# Patient Record
Sex: FEMALE | Race: WHITE | NOT HISPANIC OR LATINO | Employment: OTHER | URBAN - METROPOLITAN AREA
[De-identification: names, ages, dates, MRNs, and addresses within clinical notes are randomized per-mention and may not be internally consistent; named-entity substitution may affect disease eponyms.]

---

## 2017-05-15 ENCOUNTER — TRANSFERRED RECORDS (OUTPATIENT)
Dept: HEALTH INFORMATION MANAGEMENT | Facility: CLINIC | Age: 67
End: 2017-05-15
Payer: MEDICARE

## 2019-02-18 ENCOUNTER — TRANSFERRED RECORDS (OUTPATIENT)
Dept: HEALTH INFORMATION MANAGEMENT | Facility: CLINIC | Age: 69
End: 2019-02-18
Payer: MEDICARE

## 2021-09-15 ENCOUNTER — TRANSFERRED RECORDS (OUTPATIENT)
Dept: HEALTH INFORMATION MANAGEMENT | Facility: CLINIC | Age: 71
End: 2021-09-15
Payer: MEDICARE

## 2022-03-27 NOTE — TELEPHONE ENCOUNTER
FUTURE VISIT INFORMATION      FUTURE VISIT INFORMATION:    Date: 4/27/2022    Time: 3pm    Location: [facility]  REFERRAL INFORMATION:    Referring provider:  Dr. Adler    Referring providers clinic:  [facility]     Reason for visit/diagnosis  Dizziness     RECORDS REQUESTED FROM:       Clinic name Comments Records Status Imaging Status   [facility]  Requested  Requested          [facility]-10/29/2021    [facility] Puga-10/25/2021 Care Everywhere Requested to PACS                       3/28/2022-Request for images faxed to [facility]-MR @ 822am    4/20/2022-[facility] images now in PACS-MR @ 710am

## 2022-04-26 NOTE — PROGRESS NOTES
[facility]    Neurology Consult    4/27/2022      Pat Barclay MRN# [id number]   YOB: 1950 Age: 71 year old      Primary care provider:   No primary care provider on file.    Requesting provider:   Jia Adler    Reason for Consult:  Dizziness and imbalance    IMPRESSIONS:  Pat Barclay is a 71 year old female with a past medical history of bradycardia s/p pacemaker, progressive cerebral atrophy and white matter disease, bilateral hearing loss, who presents with dizziness and imbalance after neck extension. On exam she has downbeat nystagmus that can be elicited with head extension in all positions (right, center, left) which is associated with her vertigo. She has severely impaired smooth pursuit but no gaze-evoked nystagmus. This pattern is not typical of BPPV but is concerning for a central process, like a cerebellar degeneration or a lesion in the posterior fossa. Because of her pace-maker, we'll start with a CT scan. I will order it as a CT venogram so that we can see the vasculature at the skull base, which can be compressed with head extension. There is also a mild left sided dysmetria which also points to a cerebellar lesion. There is a flavor of syndromic-ness to her picture given the severe hearing loss, white matter disease, bradycardia, and now the central eye movement abnormality and ataxia.      We'll work up a structural lesion but will also consider an empiric trial of 4-AP to suppress the downbeat nystagmus and potentially help with the imbalance.     The patient will schedule the CT venogram and an accompanying ultrasound at her convenience and we will follow-up after the testing. We will obtain the MRI of the brain from [facility].     Recommendations:  1. Consider 4AP  2. CTVenogram head and neck in neutal and head extension  3. US TOS/IJ   4. Follow-up after imaging    HISTORY OF PRESENT ILLNESS:  Pat Barclay is a 71 year old female with a  past medical history of bradycardia s/p pacemaker, progressive cerebral atrophy and white matter disease who presents with the following symptoms:    DIZZINESS:  She first noticed symptoms in 2004 when she was extending her head looking at birds. She felt a swirling vertigo that stopped when the head was back in neutral position. This continued to be a bad head position so she avoided it. Flexion and head turning to the side were not bad. She was pretty stable until recently. The symptoms became persistent despite head position, perhaps around Spring 2021. She was not sick at the time. She has never had COVID through all this. She has had both Pfizers and a booster. She did have a mild headache after each vaccine but otherwise tolerated them fine.      This feeling of swirling can now be triggered by getting up from a chair. When sitting still and not moving her head she feels a little swirling but there is no clear directionality to it. She does not notice it as much riding in a car. She doesn't drive much herself. There are days when the symptoms are more severe and when that happens her balance is very off. On these days she will take some aspirin or ibuprofen and lay down. She does not feel the swirling laying flat.     There is rarely spinning vertigo. There is no rocking vertigo.     AURAL SYMPTOMS:  No tinnitus or ear pressure. She has hearing loss and has hearing aids in both ears.     HEADACHE:  There is sometimes some head pressure on the forehead, noticed in the last year. This is better when she lays down. It does not wake her up. The pressure can be there all day. There is no nausea, light, or sound sensitivity. She does not have a history of migraine or other kinds of headache. She does not have a history of visual aura. She has not lost vision in one eye. She does not experience diplopia.     NECK/UPPER EXT SYMPTOMS: There is no neck pain, shoulder pain, numbness, tingling, or swelling in the hands.  Her hands do feel cold however and this is worse in the right arm which goes up to the elbow. The hands do turn white.     BULBAR SYMPTOMS:  There is no dysphagia, throat pressure, or chronic cough. She does not endorse speech problems. She sometimes has a hard time thinking of the words to say. There have been mild problems with memory. She can chart the course of the day and not do dangerous things like leave the stove on.     CARDIAC:  No chest pain, shortness of breath, palpitation, or syncope. She has a pacemaker for bradycardia placed in 2016. She has recently found out that she has had some low blood pressure last year.     OTHER:   She feels freezing cold all the time.     PAST MEDICAL HISTORY:  Bradycardia  Low blood pressure  Hypothyroidism  Insomnia  Depression    PAST SURGICAL HISTORY:  Pacemaker for bradycardia 2016--single lead pacemaker in the right atrium.  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  Her son  suddenly for cardiomyopathy in  at the age of 24 potentially from a virus.     ALLERGIES:  Naprosyn--redness, no itching.       MEDICATIONS:      azelaic acid (FINACIA) 15 % external gel, Apply 1 g topically See Admin Instructions, Disp: , Rfl:      cholecalciferol (VITAMIN D3) 25 mcg (1000 units) capsule, Take 1,000 Units by mouth daily, Disp: , Rfl:      estrogen conj-medroxyPROGESTERone (PREMPRO) 0.3-1.5 MG tablet, Take 1 tablet by mouth See Admin Instructions, Disp: , Rfl:      fludrocortisone (FLORINEF) 0.1 MG tablet, Take 1 tablet by mouth See Admin Instructions, Disp: , Rfl:      levothyroxine (SYNTHROID/LEVOTHROID) 50 MCG tablet, Take 1 tablet by mouth daily (with dinner), Disp: , Rfl:      traZODone (DESYREL) 50 MG tablet, Take 1 tablet by mouth See Admin Instructions, Disp: , Rfl:      albuterol (PROAIR  HFA/PROVENTIL HFA/VENTOLIN HFA) 108 (90 Base) MCG/ACT inhaler, Inhale 1 puff into the lungs See Admin Instructions (Patient not taking: Reported on 4/27/2022), Disp: , Rfl:      amoxicillin (AMOXIL) 500 MG capsule, Take 1 capsule by mouth See Admin Instructions (Patient not taking: Reported on 4/27/2022), Disp: , Rfl:      citalopram (CELEXA) 10 MG tablet, Take 1 tablet by mouth See Admin Instructions (Patient not taking: Reported on 4/27/2022), Disp: , Rfl:      cyclobenzaprine (FLEXERIL) 5 MG tablet, Take 1 tablet by mouth daily as needed (Patient not taking: Reported on 4/27/2022), Disp: , Rfl:      HYDROcodone-acetaminophen (NORCO) 5-325 MG tablet, Take 1 tablet by mouth See Admin Instructions (Patient not taking: Reported on 4/27/2022), Disp: , Rfl:      meclizine (ANTIVERT) 25 MG tablet, Take 1 tablet by mouth daily (Patient not taking: Reported on 4/27/2022), Disp: , Rfl:      methylPREDNISolone (MEDROL DOSEPAK) 4 MG tablet therapy pack, Take 1 tablet by mouth See Admin Instructions (Patient not taking: Reported on 4/27/2022), Disp: , Rfl:     PHYSICAL EXAM:  Vitals:  /53   Pulse 87   SpO2 97%     General: Patient is well-nourished, well-groomed, in no apparent distress    HEENT: Head is atraumatic, eyes look normal exteriorly, throat clear, neck supple.  Ext: Warm, well-perfused. No edema.    Neurologic:  Mental Status: Alert and oriented to person, place, time, and situation.  Able to provide an excellent history.     Cranial Nerves: Visual fields full to confrontation.  Pupils equal and reactive to light.  Extraocular movements full with impaired smooth pursuit in all directions. Normal saccade. OK upgaze.  Face sensation normal.  Normal head impulse testing.  Normal hearing to finger rub but has hearing aids. Face symmetric with normal movements. Slight dysarthria. Tongue and palate midline.  Normal shoulder elevation.      Motor: Normal bulk and tone.  No pronator drift.  Normal foot taps.  Full  strength to confrontational testing.    Sensory: Normal light touch, temperature, and vibration.    Reflexes: Biceps 2/4, Brachioradialis 1/4, Triceps 2/4, Knees 2/4 on the right 3/4 on the left, Ankles 2/4.     Coordination: Normal finger to nose, rapid alternating movements are incoordinated on the left. Slight dysmetria and tremor end point on the left.     Gait: Normal stance width.  Negative Romberg but does sway a little.  Good gait initiation.  Good stride length. Reduced arm swing but clearly present. No tremor with gait. No posturing with gait. Stumbles with turn and grabs the wall. Not able to walk 5 steps in tandem without a side step.     Focused Vestibular:  Positional Testing: Patient was placed in the supine, right ear down, left ear down, right head-hanging, center-hanging, and left head-hanging positions.  There was immediate onset of downbeat nystagmus and vertigo elicited in all positions along with nausea. The downbeat was most prominent in the center head hanging position. Nystagmus lasted at least 20-seconds in each position but positioning was not maintained longer due to patient nausea. The nystagmus did not decay.     Adson's test for Thoracic Outlet Syndrome:  Arms abducted: Negative  Pain under the RIGHT clavicle: No   Pain at the RIGHT 1st rib-sternum insertion site: No  Pain under the LEFT clavicle: No  Pain at the LEFT 1st rib-sternum insertion site: NO     DATA:  All available and relevant labs, imaging, and other procedures were reviewed personally.   Last brain imaging:  MRI brain without contrast performed in [place] at [facility] 9- read as mild parenchymal volume loss and moderate supratentorial white matter pontine hyperintensities consistent with areas of chronic microvascular ischemic change.  Volume loss and chronic microvascular ischemic changes have progressed as compared to the 2008 study.  Ventricles are mildly dilated, proportional to sulci, likely a result of  central atrophy.  Mild bilateral temporomandibular joint arthropathy.  Small left mastoid fluid.      MRI lumbar spine with and without contrast [facility] imaging 5- in [facility] read as showing transitional lumbosacral morphology.  Demonstration of right hemilaminectomy changes at L2-3 and L3-4 and left hemilaminectomy changes at L5-S1 demonstration a circumferential disc osteophyte complex at L2-3 impinging on the right descending L3 nerve.  Demonstration of circumferential disc osteophyte complex at L4-L5 with impingement of the left descending L5 nerve unchanged mild to moderate left foraminal narrowing at L4-5 mild right foraminal narrowing at L2-3Last audiogram:    70-minutes were spent in evaluation, examination, and documentation.

## 2022-04-27 ENCOUNTER — PRE VISIT (OUTPATIENT)
Dept: NEUROLOGY | Facility: CLINIC | Age: 72
End: 2022-04-27

## 2022-04-27 ENCOUNTER — OFFICE VISIT (OUTPATIENT)
Dept: NEUROLOGY | Facility: CLINIC | Age: 72
End: 2022-04-27
Payer: MEDICARE

## 2022-04-27 VITALS — SYSTOLIC BLOOD PRESSURE: 107 MMHG | OXYGEN SATURATION: 97 % | HEART RATE: 87 BPM | DIASTOLIC BLOOD PRESSURE: 53 MMHG

## 2022-04-27 DIAGNOSIS — R27.0 ATAXIA: ICD-10-CM

## 2022-04-27 DIAGNOSIS — G54.0 TOS (THORACIC OUTLET SYNDROME): ICD-10-CM

## 2022-04-27 DIAGNOSIS — I87.1 COMPRESSION OF VEIN: Primary | ICD-10-CM

## 2022-04-27 DIAGNOSIS — H55.00 NYSTAGMUS: ICD-10-CM

## 2022-04-27 PROCEDURE — 99205 OFFICE O/P NEW HI 60 MIN: CPT | Performed by: PSYCHIATRY & NEUROLOGY

## 2022-04-27 RX ORDER — CYCLOBENZAPRINE HCL 5 MG
1 TABLET ORAL DAILY PRN
COMMUNITY
End: 2022-10-07

## 2022-04-27 RX ORDER — METHYLPREDNISOLONE 4 MG
1 TABLET, DOSE PACK ORAL SEE ADMIN INSTRUCTIONS
COMMUNITY
Start: 2021-11-16 | End: 2022-10-07

## 2022-04-27 RX ORDER — FLUDROCORTISONE ACETATE 0.1 MG/1
1 TABLET ORAL SEE ADMIN INSTRUCTIONS
COMMUNITY
Start: 2021-04-29 | End: 2022-10-07

## 2022-04-27 RX ORDER — AZELAIC ACID 0.15 G/G
1 GEL TOPICAL SEE ADMIN INSTRUCTIONS
COMMUNITY
Start: 2021-09-13 | End: 2022-10-07

## 2022-04-27 RX ORDER — AMOXICILLIN 500 MG/1
1 CAPSULE ORAL SEE ADMIN INSTRUCTIONS
COMMUNITY
Start: 2021-05-03 | End: 2022-10-07

## 2022-04-27 RX ORDER — CITALOPRAM HYDROBROMIDE 10 MG/1
1 TABLET ORAL SEE ADMIN INSTRUCTIONS
COMMUNITY
Start: 2021-06-01 | End: 2023-01-20

## 2022-04-27 RX ORDER — TRAZODONE HYDROCHLORIDE 50 MG/1
1 TABLET, FILM COATED ORAL SEE ADMIN INSTRUCTIONS
COMMUNITY
Start: 2022-04-19

## 2022-04-27 RX ORDER — ALBUTEROL SULFATE 90 UG/1
1 AEROSOL, METERED RESPIRATORY (INHALATION) SEE ADMIN INSTRUCTIONS
COMMUNITY
End: 2022-10-07

## 2022-04-27 RX ORDER — MECLIZINE HYDROCHLORIDE 25 MG/1
1 TABLET ORAL DAILY
COMMUNITY
Start: 2021-04-29 | End: 2022-10-07

## 2022-04-27 RX ORDER — LEVOTHYROXINE SODIUM 50 UG/1
1 TABLET ORAL
COMMUNITY
Start: 2021-06-01 | End: 2023-01-20

## 2022-04-27 RX ORDER — ESTROGEN,CON/M-PROGEST ACET 0.3-1.5MG
1 TABLET ORAL SEE ADMIN INSTRUCTIONS
COMMUNITY
Start: 2022-04-19

## 2022-04-27 RX ORDER — HYDROCODONE BITARTRATE AND ACETAMINOPHEN 5; 325 MG/1; MG/1
1 TABLET ORAL SEE ADMIN INSTRUCTIONS
COMMUNITY
Start: 2021-09-13 | End: 2023-01-20

## 2022-04-27 NOTE — Clinical Note
4/27/2022       RE: Pat Barclay  [facility] [facility] [address]  [place] [address]     Dear Colleague,    Thank you for referring your patient, Pat Barclay, to the [facility]. Please see a copy of my visit note below.    No notes on file    Again, thank you for allowing me to participate in the care of your patient.      Sincerely,    Elva PEDROZA Cha, MD

## 2022-04-27 NOTE — LETTER
4/27/2022       RE: Pat Barclay  [address]  [place] [address]     Dear Colleague,    Thank you for referring your patient, Pat Braclay, to the [facility] [place] [facility]. Please see a copy of my visit note below.    [facility]    Neurology Consult    4/27/2022      Pat Barclay MRN# [id number]   YOB: 1950 Age: 71 year old      Primary care provider:   No primary care provider on file.    Requesting provider:   Jia Adler    Reason for Consult:  Dizziness and imbalance    IMPRESSIONS:  Pat Barclay is a 71 year old female with a past medical history of bradycardia s/p pacemaker, progressive cerebral atrophy and white matter disease, bilateral hearing loss, who presents with dizziness and imbalance after neck extension. On exam she has downbeat nystagmus that can be elicited with head extension in all positions (right, center, left) which is associated with her vertigo. She has severely impaired smooth pursuit but no gaze-evoked nystagmus. This pattern is not typical of BPPV but is concerning for a central process, like a cerebellar degeneration or a lesion in the posterior fossa. Because of her pace-maker, we'll start with a CT scan. I will order it as a CT venogram so that we can see the vasculature at the skull base, which can be compressed with head extension. There is also a mild left sided dysmetria which also points to a cerebellar lesion. There is a flavor of syndromic-ness to her picture given the severe hearing loss, white matter disease, bradycardia, and now the central eye movement abnormality and ataxia.      We'll work up a structural lesion but will also consider an empiric trial of 4-AP to suppress the downbeat nystagmus and potentially help with the imbalance.     The patient will schedule the CT venogram and an accompanying ultrasound at her convenience  and we will follow-up after the testing. We will obtain the MRI of the brain from "[employer] imaging.     Recommendations:  1. Consider 4AP  2. CTVenogram head and neck in neutal and head extension  3. US TOS/IJ   4. Follow-up after imaging    HISTORY OF PRESENT ILLNESS:  Pat Barclay is a 71 year old female with a past medical history of bradycardia s/p pacemaker, progressive cerebral atrophy and white matter disease who presents with the following symptoms:    DIZZINESS:  She first noticed symptoms in 2004 when she was extending her head looking at birds. She felt a swirling vertigo that stopped when the head was back in neutral position. This continued to be a bad head position so she avoided it. Flexion and head turning to the side were not bad. She was pretty stable until recently. The symptoms became persistent despite head position, perhaps around Spring 2021. She was not sick at the time. She has never had COVID through all this. She has had both Pfizers and a booster. She did have a mild headache after each vaccine but otherwise tolerated them fine.      This feeling of swirling can now be triggered by getting up from a chair. When sitting still and not moving her head she feels a little swirling but there is no clear directionality to it. She does not notice it as much riding in a car. She doesn't drive much herself. There are days when the symptoms are more severe and when that happens her balance is very off. On these days she will take some aspirin or ibuprofen and lay down. She does not feel the swirling laying flat.     There is rarely spinning vertigo. There is no rocking vertigo.     AURAL SYMPTOMS:  No tinnitus or ear pressure. She has hearing loss and has hearing aids in both ears.     HEADACHE:  There is sometimes some head pressure on the forehead, noticed in the last year. This is better when she lays down. It does not wake her up. The pressure can be there all day. There is no nausea, light, or  sound sensitivity. She does not have a history of migraine or other kinds of headache. She does not have a history of visual aura. She has not lost vision in one eye. She does not experience diplopia.     NECK/UPPER EXT SYMPTOMS: There is no neck pain, shoulder pain, numbness, tingling, or swelling in the hands. Her hands do feel cold however and this is worse in the right arm which goes up to the elbow. The hands do turn white.     BULBAR SYMPTOMS:  There is no dysphagia, throat pressure, or chronic cough. She does not endorse speech problems. She sometimes has a hard time thinking of the words to say. There have been mild problems with memory. She can chart the course of the day and not do dangerous things like leave the stove on.     CARDIAC:  No chest pain, shortness of breath, palpitation, or syncope. She has a pacemaker for bradycardia placed in 2016. She has recently found out that she has had some low blood pressure last year.     OTHER:   She feels freezing cold all the time.     PAST MEDICAL HISTORY:  Bradycardia  Low blood pressure  Hypothyroidism  Insomnia  Depression    PAST SURGICAL HISTORY:  Pacemaker for bradycardia 2016--single lead pacemaker in the right atrium.  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     ALLERGIES:  Naprosyn--redness, no itching.       MEDICATIONS:      azelaic acid (FINACIA) 15 % external gel, Apply 1 g topically See Admin Instructions, Disp: , Rfl:      cholecalciferol (VITAMIN D3) 25 mcg (1000 units) capsule, Take 1,000 Units by mouth daily, Disp: , Rfl:      estrogen conj-medroxyPROGESTERone (PREMPRO) 0.3-1.5 MG tablet, Take 1 tablet by mouth See Admin Instructions, Disp: , Rfl:      fludrocortisone (FLORINEF)  0.1 MG tablet, Take 1 tablet by mouth See Admin Instructions, Disp: , Rfl:      levothyroxine (SYNTHROID/LEVOTHROID) 50 MCG tablet, Take 1 tablet by mouth daily (with dinner), Disp: , Rfl:      traZODone (DESYREL) 50 MG tablet, Take 1 tablet by mouth See Admin Instructions, Disp: , Rfl:      albuterol (PROAIR HFA/PROVENTIL HFA/VENTOLIN HFA) 108 (90 Base) MCG/ACT inhaler, Inhale 1 puff into the lungs See Admin Instructions (Patient not taking: Reported on 4/27/2022), Disp: , Rfl:      amoxicillin (AMOXIL) 500 MG capsule, Take 1 capsule by mouth See Admin Instructions (Patient not taking: Reported on 4/27/2022), Disp: , Rfl:      citalopram (CELEXA) 10 MG tablet, Take 1 tablet by mouth See Admin Instructions (Patient not taking: Reported on 4/27/2022), Disp: , Rfl:      cyclobenzaprine (FLEXERIL) 5 MG tablet, Take 1 tablet by mouth daily as needed (Patient not taking: Reported on 4/27/2022), Disp: , Rfl:      HYDROcodone-acetaminophen (NORCO) 5-325 MG tablet, Take 1 tablet by mouth See Admin Instructions (Patient not taking: Reported on 4/27/2022), Disp: , Rfl:      meclizine (ANTIVERT) 25 MG tablet, Take 1 tablet by mouth daily (Patient not taking: Reported on 4/27/2022), Disp: , Rfl:      methylPREDNISolone (MEDROL DOSEPAK) 4 MG tablet therapy pack, Take 1 tablet by mouth See Admin Instructions (Patient not taking: Reported on 4/27/2022), Disp: , Rfl:     PHYSICAL EXAM:  Vitals:  /53   Pulse 87   SpO2 97%     General: Patient is well-nourished, well-groomed, in no apparent distress    HEENT: Head is atraumatic, eyes look normal exteriorly, throat clear, neck supple.  Ext: Warm, well-perfused. No edema.    Neurologic:  Mental Status: Alert and oriented to person, place, time, and situation.  Able to provide an excellent history.     Cranial Nerves: Visual fields full to confrontation.  Pupils equal and reactive to light.  Extraocular movements full with impaired smooth pursuit in all directions. Normal  saccade. OK upgaze.  Face sensation normal.  Normal head impulse testing.  Normal hearing to finger rub but has hearing aids. Face symmetric with normal movements. Slight dysarthria. Tongue and palate midline.  Normal shoulder elevation.      Motor: Normal bulk and tone.  No pronator drift.  Normal foot taps.  Full strength to confrontational testing.    Sensory: Normal light touch, temperature, and vibration.    Reflexes: Biceps 2/4, Brachioradialis 1/4, Triceps 2/4, Knees 2/4 on the right 3/4 on the left, Ankles 2/4.     Coordination: Normal finger to nose, rapid alternating movements are incoordinated on the left. Slight dysmetria and tremor end point on the left.     Gait: Normal stance width.  Negative Romberg but does sway a little.  Good gait initiation.  Good stride length. Reduced arm swing but clearly present. No tremor with gait. No posturing with gait. Stumbles with turn and grabs the wall. Not able to walk 5 steps in tandem without a side step.     Focused Vestibular:  Positional Testing: Patient was placed in the supine, right ear down, left ear down, right head-hanging, center-hanging, and left head-hanging positions.  There was immediate onset of downbeat nystagmus and vertigo elicited in all positions along with nausea. The downbeat was most prominent in the center head hanging position. Nystagmus lasted at least 20-seconds in each position but positioning was not maintained longer due to patient nausea. The nystagmus did not decay.     Adson's test for Thoracic Outlet Syndrome:  Arms abducted: Negative  Pain under the RIGHT clavicle: No   Pain at the RIGHT 1st rib-sternum insertion site: No  Pain under the LEFT clavicle: No  Pain at the LEFT 1st rib-sternum insertion site: NO     DATA:  All available and relevant labs, imaging, and other procedures were reviewed personally.   Last brain imaging:  MRI brain without contrast performed in [place] at [facility] 9- read as mild parenchymal  volume loss and moderate supratentorial white matter pontine hyperintensities consistent with areas of chronic microvascular ischemic change.  Volume loss and chronic microvascular ischemic changes have progressed as compared to the 2008 study.  Ventricles are mildly dilated, proportional to sulci, likely a result of central atrophy.  Mild bilateral temporomandibular joint arthropathy.  Small left mastoid fluid.      MRI lumbar spine with and without contrast [facility] imaging 5- in [facility] read as showing transitional lumbosacral morphology.  Demonstration of right hemilaminectomy changes at L2-3 and L3-4 and left hemilaminectomy changes at L5-S1 demonstration a circumferential disc osteophyte complex at L2-3 impinging on the right descending L3 nerve.  Demonstration of circumferential disc osteophyte complex at L4-L5 with impingement of the left descending L5 nerve unchanged mild to moderate left foraminal narrowing at L4-5 mild right foraminal narrowing at L2-3Last audiogram:    70-minutes were spent in evaluation, examination, and documentation.        Sincerely,    Elva PEDROZA Cha, MD

## 2022-05-12 ENCOUNTER — ANCILLARY PROCEDURE (OUTPATIENT)
Dept: CT IMAGING | Facility: CLINIC | Age: 72
End: 2022-05-12
Attending: PSYCHIATRY & NEUROLOGY
Payer: MEDICARE

## 2022-05-12 DIAGNOSIS — G54.0 TOS (THORACIC OUTLET SYNDROME): ICD-10-CM

## 2022-05-12 DIAGNOSIS — H55.00 NYSTAGMUS: ICD-10-CM

## 2022-05-12 DIAGNOSIS — I87.1 COMPRESSION OF VEIN: ICD-10-CM

## 2022-05-12 LAB
CREAT BLD-MCNC: 0.7 MG/DL (ref 0.5–1)
GFR SERPL CREATININE-BSD FRML MDRD: >60 ML/MIN/1.73M2

## 2022-05-12 PROCEDURE — 82565 ASSAY OF CREATININE: CPT | Performed by: PATHOLOGY

## 2022-05-12 PROCEDURE — 70496 CT ANGIOGRAPHY HEAD: CPT | Mod: MG | Performed by: RADIOLOGY

## 2022-05-12 PROCEDURE — 70498 CT ANGIOGRAPHY NECK: CPT | Mod: MG | Performed by: RADIOLOGY

## 2022-05-12 PROCEDURE — G1004 CDSM NDSC: HCPCS | Mod: GC | Performed by: RADIOLOGY

## 2022-05-12 RX ORDER — IOPAMIDOL 755 MG/ML
75 INJECTION, SOLUTION INTRAVASCULAR ONCE
Status: COMPLETED | OUTPATIENT
Start: 2022-05-12 | End: 2022-05-12

## 2022-05-12 RX ADMIN — IOPAMIDOL 75 ML: 755 INJECTION, SOLUTION INTRAVASCULAR at 09:00

## 2022-05-17 ENCOUNTER — TELEPHONE (OUTPATIENT)
Dept: NEUROLOGY | Facility: CLINIC | Age: 72
End: 2022-05-17
Payer: MEDICARE

## 2022-05-17 NOTE — TELEPHONE ENCOUNTER
[facility] Call Center    Phone Message    May a detailed message be left on voicemail: yes     Reason for Call: Other: Patient is wanting a call back to discuss results from her appt with Dr. Segundo on 4/27/2022. Please call patient back at [phone]     Action Taken: Message routed to:  [facility] ([facility] Neurology    Travel Screening: Not Applicable

## 2022-05-18 ENCOUNTER — TELEPHONE (OUTPATIENT)
Dept: NEUROLOGY | Facility: CLINIC | Age: 72
End: 2022-05-18
Payer: MEDICARE

## 2022-05-18 NOTE — TELEPHONE ENCOUNTER
Returned call confirming I received her phone call back about visit offered on 5/27. We will schedule her for return video visit on 5/27 at 4:30p with Dr. Segundo to review test results.     Janine JACOME

## 2022-05-18 NOTE — TELEPHONE ENCOUNTER
I returned pt call. I lvm. I let her know Dr. Segundo reviewed her message. Dr. Segundo would like to see her for a follow up visit to review test results. We can add her for a virtual visit on Friday May 27th at 4:30p. I asked she call back to confirm day/time.     Janine JACOME

## 2022-05-27 ENCOUNTER — VIRTUAL VISIT (OUTPATIENT)
Dept: NEUROLOGY | Facility: CLINIC | Age: 72
End: 2022-05-27
Payer: MEDICARE

## 2022-05-27 DIAGNOSIS — R27.0 ATAXIA: Primary | ICD-10-CM

## 2022-05-27 DIAGNOSIS — H55.00 NYSTAGMUS: ICD-10-CM

## 2022-05-27 PROCEDURE — 99214 OFFICE O/P EST MOD 30 MIN: CPT | Mod: 95 | Performed by: PSYCHIATRY & NEUROLOGY

## 2022-05-27 NOTE — LETTER
"5/27/2022       RE: Pat Barclay  [address] [place] [address] [address]  [place] [address]     Dear Colleague,    Thank you for referring your patient, Pat Barclay, to the [facility] NEUROLOGY CLINIC [place] [facility]. Please see a copy of my visit note below.      IMPRESSIONS:  Pat Barclay is a 71 year old female with a past medical history of bradycardia s/p pacemaker, progressive cerebral atrophy and white matter disease, bilateral hearing loss, who presents with dizziness and imbalance after neck extension. On exam she has downbeat nystagmus that can be elicited with head extension in all positions (right, center, left) which is associated with her vertigo. She has severely impaired smooth pursuit but no gaze-evoked nystagmus. This pattern is not typical of BPPV but is concerning for a central process, like a cerebellar degeneration or a lesion in the posterior fossa.     Because of her pace-maker, we started with a CT venogram so that we can see the vasculature at the skull base, which can be compressed with head extension. There is also a mild left sided dysmetria which also points to a cerebellar lesion. There is a flavor of syndromic-ness to her picture given the severe hearing loss, white matter disease, bradycardia, and now the central eye movement abnormality and ataxia.       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 The volume loss was so severe that it almost looked like a cyst. See screenshot below.    She and her  both caught COVID in the last month. She had a " fever, cough, headache, bad sore throat. She did not have trouble breathing. She did not lose smell or taste. Food had a bit of metallic taste.  The main problem is the imbalance along with a sense of faintness. It is not a feeling of disorientation or vertigo. She does not have headache. She does not have problems with visual disorientation or simultagnosia.     Mother  at 80-stroke  Father  at 78-lung cancer   Sister-alive at 75, HTN   Son and daughter, healthy  Son-alive 48, healthy  No balance problems    We discussed the CT exam findings showing the areas of particular brain atrophy and the correlation with her ataxia and downbeat nystagmus. A trial of 4-AP which can suppress downbeat nystagmus and help balance is definitely worth a try. We will also start an ataxia screen with some metabolic tests and genetic screen.    Plan:  1. Trial 4-AP--Rx sent to [facility]  2. Paraneoplastic, Vitamin E, Anti-KRYSTA  3. Consider genetics referral if tests are negative      DATA:  I personally reviewed the following data.    Last brain imaging:  CTV HEAD NECK WITH CONTRAST 2022    Findings: Diminutive size of the left transverse, sigmoid, and  internal jugular vein relative to the right. The left internal jugular  vein then is markedly narrowed between the styloid process and  transverse process of C1 which also persists on the extension images.  No intraluminal filling defects visualized intracranially or  extracranially. Bilateral subclavian and axillary veins are widely  patent without evidence for stenosis with the arms down.    There is no mass effect, midline shift, or  intracranial hemorrhage.   The ventricles are proportionate to the cerebral sulci. The gray-white  matter differentiation of the cerebral hemispheres is preserved.  Postcontrast images demonstrate no abnormal intracranial parenchymal  or meningeal enhancement. Moderate diffuse cerebral atrophy. Unchanged  asymmetric volume loss in  the right parasagittal occipital lobe.     The orbits, visualized portions of paranasal sinuses, and mastoid air  cells are relatively clear.     Scattered multilevel cervical spondylosis without significant spinal  canal or neuroforaminal narrowing. No suspicious sclerotic or lucent  osseous lesion.    Cervical soft tissues are unremarkable. Thyroid is unremarkable.    Partially visualized left chest wall ICD/Pacemaker.  Impression: Impression:    1. 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 Significance is uncertain given  the small size of this vein inferiorly. No intraluminal thrombus or  significant extraluminal stenosis in either the intracranial or  extracranial veins.  2. No CT evidence for thoracic outlet syndrome.  3. Moderate diffuse cerebral volume loss, likely sequelae of chronic  small vessel ischemic disease.    I have personally reviewed the examination and initial interpretation  and I agree with the findings.    ROCÍO TORRES MD                 38-minutes were spent in evaluation, examination, and counseling as well as documentation on the date of service. After a review of the patient s situation, this visit was changed from an in-person visit to a video visit to reduce the risk of COVID-19 exposure.          Sincerely,    Elva PEDROZA Cha, MD

## 2022-05-27 NOTE — Clinical Note
5/27/2022       RE: Pat Barclay  [facility]  [place] [address]     Dear Colleague,    Thank you for referring your patient, Pat Barclay, to the [facility]UROLOG[facility]. Please see a copy of my visit note below.    No notes on file    Again, thank you for allowing me to participate in the care of your patient.      Sincerely,    Elva PEDROZA Cha, MD

## 2022-05-27 NOTE — PROGRESS NOTES
"The patient is being evaluated via a billable video visit.    How would you like to obtain your AVS? MyChart  If the video visit is dropped, the invitation should be resent by: Send to e-mail at: [email or web address]  Will anyone else be joining your video visit? No      Video-Visit Details  Type of service:  Video Visit  Video Start Time:4:35 PM  Video End Time:5:03 PM  Originating Location (pt. Location): Home  Distant Location (provider location):  [facility] NEUROLOGY [facility]   Platform used for Video Visit: [employer]    IMPRESSIONS:  Pat Barclay is a 71 year old female with a past medical history of bradycardia s/p pacemaker, progressive cerebral atrophy and white matter disease, bilateral hearing loss, who presents with dizziness and imbalance after neck extension. On exam she has downbeat nystagmus that can be elicited with head extension in all positions (right, center, left) which is associated with her vertigo. She has severely impaired smooth pursuit but no gaze-evoked nystagmus. This pattern is not typical of BPPV but is concerning for a central process, like a cerebellar degeneration or a lesion in the posterior fossa.     Because of her pace-maker, we started with a CT venogram so that we can see the vasculature at the skull base, which can be compressed with head extension. There is also a mild left sided dysmetria which also points to a cerebellar lesion. There is a flavor of syndromic-ness to her picture given the severe hearing loss, white matter disease, bradycardia, and now the central eye movement abnormality and ataxia.       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 The volume loss was so severe that it almost looked like a cyst. See screenshot below.    She and her  both caught COVID in the last month. She had a fever, cough, headache, bad sore throat. She did not have trouble breathing. She did not lose smell or taste. Food had a bit of metallic taste.  The main problem is the imbalance along with a sense of faintness. It is not a feeling of disorientation or vertigo. She does not have headache. She does not have problems with visual disorientation or simultagnosia.     Mother  at 80-stroke  Father  at 78-lung cancer   Sister-alive at 75, HTN   Son and daughter, healthy  Son-alive 48, healthy  No balance problems    We discussed the CT exam findings showing the areas of particular brain atrophy and the correlation with her ataxia and downbeat nystagmus. A trial of 4-AP which can suppress downbeat nystagmus and help balance is definitely worth a try. We will also start an ataxia screen with some metabolic tests and genetic screen.    Plan:  1. Trial 4-AP--Rx sent to [facility]  2. Paraneoplastic, Vitamin E, Anti-KRYSTA  3. Consider genetics referral if tests are negative      DATA:  I personally reviewed the following data.    Last brain imaging:  CTV HEAD NECK WITH CONTRAST 2022    Findings: Diminutive size of the left transverse, sigmoid, and  internal jugular vein relative to the right. The left internal jugular  vein then is markedly narrowed between the styloid process and  transverse process of C1 which also persists on the extension images.  No intraluminal filling defects visualized intracranially or  extracranially. Bilateral subclavian and axillary veins are widely  patent without evidence for stenosis with the arms down.    There is no mass effect, midline shift, or  intracranial hemorrhage.   The ventricles are proportionate to the cerebral sulci. The  gray-white  matter differentiation of the cerebral hemispheres is preserved.  Postcontrast images demonstrate no abnormal intracranial parenchymal  or meningeal enhancement. Moderate diffuse cerebral atrophy. Unchanged  asymmetric volume loss in the right parasagittal occipital lobe.     The orbits, visualized portions of paranasal sinuses, and mastoid air  cells are relatively clear.     Scattered multilevel cervical spondylosis without significant spinal  canal or neuroforaminal narrowing. No suspicious sclerotic or lucent  osseous lesion.    Cervical soft tissues are unremarkable. Thyroid is unremarkable.    Partially visualized left chest wall ICD/Pacemaker.  Impression: Impression:    1. 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 Significance is uncertain given  the small size of this vein inferiorly. No intraluminal thrombus or  significant extraluminal stenosis in either the intracranial or  extracranial veins.  2. No CT evidence for thoracic outlet syndrome.  3. Moderate diffuse cerebral volume loss, likely sequelae of chronic  small vessel ischemic disease.    I have personally reviewed the examination and initial interpretation  and I agree with the findings.    ROCÍO TORRES MD                 38-minutes were spent in evaluation, examination, and counseling as well as documentation on the date of service. After a review of the patient s situation, this visit was changed from an in-person visit to a video visit to reduce the risk of COVID-19 exposure.

## 2022-05-29 ENCOUNTER — HEALTH MAINTENANCE LETTER (OUTPATIENT)
Age: 72
End: 2022-05-29

## 2022-06-09 ENCOUNTER — TELEPHONE (OUTPATIENT)
Dept: NEUROLOGY | Facility: CLINIC | Age: 72
End: 2022-06-09
Payer: MEDICARE

## 2022-06-09 NOTE — TELEPHONE ENCOUNTER
[facility] Call Center    Phone Message    May a detailed message be left on voicemail: yes     Reason for Call: Pt is requesting that [facility] [place] so she can have them done locally.  Please call Pt once this is done so she knows she can schedule.  Thanks.

## 2022-06-09 NOTE — TELEPHONE ENCOUNTER
I called pt and let her know I faxed her lab orders to [facility] fax: [phone]. I let her know it can take time to process but she can call them tomorrow to schedule.     Janine JACOME

## 2022-06-17 ENCOUNTER — DOCUMENTATION ONLY (OUTPATIENT)
Dept: NEUROLOGY | Facility: CLINIC | Age: 72
End: 2022-06-17
Payer: MEDICARE

## 2022-08-26 ENCOUNTER — VIRTUAL VISIT (OUTPATIENT)
Dept: NEUROLOGY | Facility: CLINIC | Age: 72
End: 2022-08-26
Payer: MEDICARE

## 2022-08-26 ENCOUNTER — TELEPHONE (OUTPATIENT)
Dept: NEUROLOGY | Facility: CLINIC | Age: 72
End: 2022-08-26

## 2022-08-26 DIAGNOSIS — G31.9 CEREBELLAR ATROPHY (H): ICD-10-CM

## 2022-08-26 DIAGNOSIS — R27.0 ATAXIA: Primary | ICD-10-CM

## 2022-08-26 DIAGNOSIS — H55.09 DOWNBEAT NYSTAGMUS: ICD-10-CM

## 2022-08-26 PROCEDURE — 99214 OFFICE O/P EST MOD 30 MIN: CPT | Mod: 95 | Performed by: PSYCHIATRY & NEUROLOGY

## 2022-08-26 NOTE — LETTER
8/26/2022     RE: Pat Barclay  [address]  [place] [address]     Dear Colleague,    Thank you for referring your patient, Pat Barclay, to the [facility] [place] [facility]. Please see a copy of my visit note below.    The patient is being evaluated via a billable video visit.    How would you like to obtain your AVS? MyChart  If the video visit is dropped, the invitation should be resent by: Send to e-mail at: [email or web address]  Will anyone else be joining your video visit? No      Video-Visit Details  Type of service:  Video Visit  Video Start Time:3:11 PM  Video End Time:3:36 PM  Originating Location (pt. Location): Home  Distant Location (provider location):  [facility] NEUROLOGY [facility]   Platform used for Video Visit: Zaranga    IMPRESSIONS:  Pat Barclay is a 72 year old female with a past medical history of bradycardia s/p pacemaker, progressive cerebral atrophy and white matter disease, bilateral hearing loss, who presents with dizziness and imbalance after neck extension. On exam she has downbeat nystagmus that can be elicited with head extension in all positions (right, center, left) which is associated with her vertigo. She has severely impaired smooth pursuit but no gaze-evoked nystagmus. This pattern is not typical of BPPV but is concerning for a central process, like a cerebellar degeneration or a lesion in the posterior fossa.      Because of her pace-maker, we started with a CT venogram so that we can see the vasculature at the skull base, which can be compressed with head extension. There is also a mild left sided dysmetria which also points to a cerebellar lesion. There is a flavor of syndromic-ness to her picture given the severe hearing loss, white matter disease, bradycardia, and now the central eye movement abnormality and ataxia.       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 The volume loss was so severe that it almost looked like a cyst. There is also loss of volume of the right occipital lobe and bilateral parietal lobes.     Interim History 8-26-22:  Trial of 4-AP, titrated to 10mg BID. She has just started on the goal dose. She has been on this for a little more than a month. When she gets up she feels lightheadedness. There is no vertigo. There is some sense of disorientation. There is no sense of oscillopsia. The trigger can be noise, standing up from sitting, head extension. If she is not moving her head, she feels okay. Her symptoms are only triggered by head movement. All direction of head movement are equally as bad. They are triggered only when she is standing up. She is fine moving her head when sitting or lying down. There is no headache or head pressure. No ear pain or ear pressure. There is sometimes ringing in the ears. There is no neck pain or tightness. There is no chest pain. No pelvic or abdominal pressure. There is no family history of similar problems.     She is not having any falls. She feels like she is \"running\" when she walks because her feet go faster than she wants them to. She does not endorse freezing of gait. She tends to walk in a zig-zag way. She walks like she is drunk.     She has mild dysarthria. Patient denies any visual problems.   Paraneoplastic panel, Anti-KRYSTA, Vitamin E were all normal 6-13-22    Impression: Potentially a syndrome of cerebellar, biparietal atrophy, white matter disease, hearing loss, bradycardia, with additional component of left internal jugular " compression at the skull base. Before considering more tests for ataxia, I'd like to obtain  from my colleague Jc Araya, an ataxia specialist, so that we can proceed more efficiently. In the meantime, she should continue with the 4-AP as she has just reached the goal dose.      Plan:   1. Continue with the 4-AP 10mg BID  2. Referral to Jc Araya, Ataxia Clinic regarding evaluation for genetic or other neurodegeneration disorders of ataxia given caudal cerebellum, parietal lobe, and occipital lobe atrophy.      DATA:  I personally reviewed the following data.    Last brain imaging:          US Up Ex Art & Jos[facility]at Thor Outlet Syn Bilat  Narrative: THORACIC INLET/OUTLET DUPLEX ULTRASOUND 5/12/2022 10:56 AM    CLINICAL HISTORY: 71F with forehead pressure and vertigo with head  extension.; Compression of vein; TOS (thoracic outlet syndrome).    COMPARISONS: None available.    REFERRING PROVIDER: GISELLE COKER CHA    TECHNIQUE: Bilateral innominate, subclavian, and axillary veins were  evaluated grayscale, color Doppler, Doppler waveform ultrasound.  Bilateral subclavian veins were evaluated with color Doppler and  Doppler waveform imaging through abduction maneuvers.    Bilateral index finger PPG's obtained at rest and with provocative  positions.    Bilateral internal jugular veins evaluated at rest with grayscale,  color Doppler, and Doppler waveform ultrasound. Bilateral internal  jugular veins evaluated with color Doppler and Doppler waveform  ultrasound through maneuvers.    FINDINGS:  RIGHT:       REST:            INTERNAL JUGULAR VEIN: 54 cm/s, phasic, fully compressible            INNOMINATE VEIN: 25 cm/s, phasic            SUBCLAVIAN VEIN, medial: 42 cm/s, phasic            SUBCLAVIAN VEIN, mid: 31 cm/s, phasic, fully compressible            SUBCLAVIAN VEIN, lateral: 73 cm/s, phasic, fully  compressible            AXILLARY VEIN: 42 cm/s, phasic, fully compressible         MID SUBCLAVIAN VEIN, sitting  erect:            0 degrees: 91 cm/s, phasic            90 degrees: 166 cm/s, phasic            135 degrees: 236 cm/s, phasic            180 degrees: 209 cm/s, phasic         INTERNAL JUGULAR VEIN, sitting erect:            Neutral: 145 cm/s, phasic            Right: 168 cm/s, phasic            Left: 166 cm/s, phasic            Extension: 154 cm/s, phasic            Flexion: 126 cm/s, phasic         PPGs:            Baseline: Normal            Arms 90: Normal            Arms 180: Severely dampened, flattened              : Normal             head right: Normal             head left: Normal            Onset: Normal    LEFT:       REST:            INTERNAL JUGULAR VEIN: 99 cm/s, phasic, fully compressible            INNOMINATE VEIN: 32 cm/s, phasic            SUBCLAVIAN VEIN, medial: 79 cm/s, phasic            SUBCLAVIAN VEIN, mid: 56 cm/s, phasic, fully compressible            SUBCLAVIAN VEIN, lateral: 36 cm/s, phasic, fully  compressible            AXILLARY VEIN: 53 cm/s, phasic, fully compressible         MID SUBCLAVIAN VEIN, sitting erect:            0 degrees: 28 cm/s, phasic            90 degrees: 124 cm/s, phasic,             135 degrees: 137 cm/s, phasic            180 degrees: 228 cm/s, phasic         INTERNAL JUGULAR VEIN, sitting erect:            Neutral: 97 cm/s, phasic            Right: 190 cm/s, phasic            Left: 75 cm/s, phasic            Extension: 92 cm/s, phasic            Flexion: 81 cm/s, phasic        PPGs:            Baseline: Normal            Arms 90: Normal            Arms 180: Severely dampened, flattened            : Normal             head right: Normal             head left: Normal            Onset: Normal  Impression: IMPRESSION: Thoracic outlet/inlet physiology suggested. Correlate for  symptoms.    1. RIGHT:       A. No subclavian venous stenosis suggested at rest.       B. increased velocities and turbulent flow in the subclavian  vein  with maneuvers without cessation of flow.       C. Patent right internal jugular vein with increased velocities  during various maneuvers, without cessation of flow.       D. Loss of PPG waveform/severe dampening at 180 degrees arm  abduction, nonspecific. Remainder of maneuvers do not dampen PPG  waveforms.    2. LEFT:       A. No subclavian venous stenosis suggested at rest.       B. increased velocities and turbulent flow in the subclavian vein  with maneuvers without cessation of flow.       C. Patent left internal jugular vein with increased velocities  during various maneuvers, without cessation of flow.       D. Loss of PPG waveform/severe dampening at 180 degrees arm  abduction, nonspecific. Remainder of maneuvers do not dampen PPG  waveforms.  .    I have personally reviewed the examination and initial interpretation  and I agree with the findings.    ELSA TAMAYO MD         SYSTEM ID:  [id number]  CTV Head w Contrast  Narrative: CTV HEAD NECK WITH CONTRAST 5/12/2022 9:22 AM    History:  71F with head extension induced vertigo with downbeat  nystagmus. Question cerebellar degeneration, venous stenosis, styloid  length,.; Compression of vein; Nystagmus; TOS (thoracic outlet  syndrome)  ICD-10: Compression of vein; Nystagmus; TOS (thoracic outlet syndrome)     Comparison: Brain MRI 9/15/2021, 5/1/2008      Technique: Helically acquired thin section axial CT images were  obtained with 1 mm collimation through the brain after intravenous  bolus injection of iodinated contrast medium with an approximately  20-25 second delay between administration of contrast and scanning.  Image data were sent to the [employer] 3D workstation and postprocessed by  the radiologist using maximum intensity pixel (MIP), multiplanar and  volume rendered 3D reconstruction programs.     Contrast: Isovue 370 75cc    Findings: Diminutive size of the left transverse, sigmoid, and  internal jugular vein relative to the right. The  left internal jugular  vein then is markedly narrowed between the styloid process and  transverse process of C1 which also persists on the extension images.  No intraluminal filling defects visualized intracranially or  extracranially. Bilateral subclavian and axillary veins are widely  patent without evidence for stenosis with the arms down.    There is no mass effect, midline shift, or  intracranial hemorrhage.   The ventricles are proportionate to the cerebral sulci. The gray-white  matter differentiation of the cerebral hemispheres is preserved.  Postcontrast images demonstrate no abnormal intracranial parenchymal  or meningeal enhancement. Moderate diffuse cerebral atrophy. Unchanged  asymmetric volume loss in the right parasagittal occipital lobe.     The orbits, visualized portions of paranasal sinuses, and mastoid air  cells are relatively clear.     Scattered multilevel cervical spondylosis without significant spinal  canal or neuroforaminal narrowing. No suspicious sclerotic or lucent  osseous lesion.    Cervical soft tissues are unremarkable. Thyroid is unremarkable.    Partially visualized left chest wall ICD/Pacemaker.  Impression: Impression:    1. 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 Significance is uncertain given  the small size of this vein inferiorly. No intraluminal thrombus or  significant extraluminal stenosis in either the intracranial or  extracranial veins.  2. No CT evidence for thoracic outlet syndrome.  3. Moderate diffuse cerebral volume loss, likely sequelae of chronic  small vessel ischemic disease.    I have personally reviewed the examination and initial interpretation  and I agree with the findings.    ROCÍO TORRES MD    SYSTEM ID:  [id number]    35-minutes were spent in  evaluation and counseling as well as documentation on the date of service. After a review of the patient s situation, this visit was changed from an in-person visit to a video visit to reduce the risk of COVID-19 exposure.      Again, thank you for allowing me to participate in the care of your patient.      Sincerely,    Elva PEDROZA Cha, MD

## 2022-08-26 NOTE — PROGRESS NOTES
"Unable to reach pt., LVM - August 26, 2022 - BB.        Pat is a 72 year old who is being evaluated via a billable video visit.      How would you like to obtain your AVS? {AVS Preference:[id number]}  If the video visit is dropped, the invitation should be resent by: {video visit invitation (Optional) :[id number]}  Will anyone else be joining your video visit? {:[id number]}  {If patient encounters technical issues they should call [phone] :[id number]}      Video-Visit Details    Video Start Time: {video visit start/end time for provider to select:192241}    Type of service:  Video Visit    Video End Time:{video visit start/end time for provider to select:675153}    Originating Location (pt. Location): {video visit patient location:[id number]::\"Home\"}    Distant Location (provider location):  [facility] NEUROLOGY [facility]     Platform used for Video Visit: {Virtual Visit Platforms:188743::\"[employer]\"}    "

## 2022-08-26 NOTE — PROGRESS NOTES
"The patient is being evaluated via a billable video visit.    How would you like to obtain your AVS? MyChart  If the video visit is dropped, the invitation should be resent by: Send to e-mail at: [email or web address]  Will anyone else be joining your video visit? No      Video-Visit Details  Type of service:  Video Visit  Video Start Time:3:11 PM  Video End Time:3:36 PM  Originating Location (pt. Location): Home  Distant Location (provider location):  [facility] NEUROLOGY 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 On exam she has downbeat nystagmus that can be elicited with head extension in all positions (right, center, left) which is associated with her vertigo. She has severely impaired smooth pursuit but no gaze-evoked nystagmus. This pattern is not typical of BPPV but is concerning for a central process, like a cerebellar degeneration or a lesion in the posterior fossa.      Because of her pace-maker, we started with a CT venogram so that we can see the vasculature at the skull base, which can be compressed with head extension. There is also a mild left sided dysmetria which also points to a cerebellar lesion. There is a flavor of syndromic-ness to her picture given the severe hearing loss, white matter disease, bradycardia, and now the central eye movement abnormality and ataxia.       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 Severe narrowing of the left internal jugular vein between the styloid process of the lateral edge of C1 is present on both neutral and extension positions.\"     On my review there was also loss of volume at the cerebellar " "vermis out of proportion to the rest of the brain and the rest of the cerebellum. The volume loss was so severe that it almost looked like a cyst. There is also loss of volume of the right occipital lobe and bilateral parietal lobes.     Interim History 8-26-22:  Trial of 4-AP, titrated to 10mg BID. She has just started on the goal dose. She has been on this for a little more than a month. When she gets up she feels lightheadedness. There is no vertigo. There is some sense of disorientation. There is no sense of oscillopsia. The trigger can be noise, standing up from sitting, head extension. If she is not moving her head, she feels okay. Her symptoms are only triggered by head movement. All direction of head movement are equally as bad. They are triggered only when she is standing up. She is fine moving her head when sitting or lying down. There is no headache or head pressure. No ear pain or ear pressure. There is sometimes ringing in the ears. There is no neck pain or tightness. There is no chest pain. No pelvic or abdominal pressure. There is no family history of similar problems.     She is not having any falls. She feels like she is \"running\" when she walks because her feet go faster than she wants them to. She does not endorse freezing of gait. She tends to walk in a zig-zag way. She walks like she is drunk.     She has mild dysarthria. Patient denies any visual problems.   Paraneoplastic panel, Anti-KRYSTA, Vitamin E were all normal 6-13-22    Impression: Potentially a syndrome of cerebellar, biparietal atrophy, white matter disease, hearing loss, bradycardia, with additional component of left internal jugular compression at the skull base. Before considering more tests for ataxia, I'd like to obtain  from my colleague Jc Araya, an ataxia specialist, so that we can proceed more efficiently. In the meantime, she should continue with the 4-AP as she has just reached the goal dose.      Plan:   1. " Continue with the 4-AP 10mg BID  2. Referral to Jc Araya, [facility]ia [facility] regarding evaluation for genetic or other neurodegeneration disorders of ataxia given caudal cerebellum, parietal lobe, and occipital lobe atrophy.      DATA:  I personally reviewed the following data.    Last brain imaging:          US Up Ex Art & Josafat Thor Outlet Syn Bilat  Narrative: THORACIC INLET/OUTLET DUPLEX ULTRASOUND 5/12/2022 10:56 AM    CLINICAL HISTORY: 71F with forehead pressure and vertigo with head  extension.; Compression of vein; TOS (thoracic outlet syndrome).    COMPARISONS: None available.    REFERRING PROVIDER: GISELLE COKER CHA    TECHNIQUE: Bilateral innominate, subclavian, and axillary veins were  evaluated grayscale, color Doppler, Doppler waveform ultrasound.  Bilateral subclavian veins were evaluated with color Doppler and  Doppler waveform imaging through abduction maneuvers.    Bilateral index finger PPG's obtained at rest and with provocative  positions.    Bilateral internal jugular veins evaluated at rest with grayscale,  color Doppler, and Doppler waveform ultrasound. Bilateral internal  jugular veins evaluated with color Doppler and Doppler waveform  ultrasound through maneuvers.    FINDINGS:  RIGHT:       REST:            INTERNAL JUGULAR VEIN: 54 cm/s, phasic, fully compressible            INNOMINATE VEIN: 25 cm/s, phasic            SUBCLAVIAN VEIN, medial: 42 cm/s, phasic            SUBCLAVIAN VEIN, mid: 31 cm/s, phasic, fully compressible            SUBCLAVIAN VEIN, lateral: 73 cm/s, phasic, fully  compressible            AXILLARY VEIN: 42 cm/s, phasic, fully compressible         MID SUBCLAVIAN VEIN, sitting erect:            0 degrees: 91 cm/s, phasic            90 degrees: 166 cm/s, phasic            135 degrees: 236 cm/s, phasic            180 degrees: 209 cm/s, phasic         INTERNAL JUGULAR VEIN, sitting erect:            Neutral: 145 cm/s, phasic            Right: 168 cm/s, phasic            Left:  166 cm/s, phasic            Extension: 154 cm/s, phasic            Flexion: 126 cm/s, phasic         PPGs:            Baseline: Normal            Arms 90: Normal            Arms 180: Severely dampened, flattened              : Normal             head right: Normal             head left: Normal            Onset: Normal    LEFT:       REST:            INTERNAL JUGULAR VEIN: 99 cm/s, phasic, fully compressible            INNOMINATE VEIN: 32 cm/s, phasic            SUBCLAVIAN VEIN, medial: 79 cm/s, phasic            SUBCLAVIAN VEIN, mid: 56 cm/s, phasic, fully compressible            SUBCLAVIAN VEIN, lateral: 36 cm/s, phasic, fully  compressible            AXILLARY VEIN: 53 cm/s, phasic, fully compressible         MID SUBCLAVIAN VEIN, sitting erect:            0 degrees: 28 cm/s, phasic            90 degrees: 124 cm/s, phasic,             135 degrees: 137 cm/s, phasic            180 degrees: 228 cm/s, phasic         INTERNAL JUGULAR VEIN, sitting erect:            Neutral: 97 cm/s, phasic            Right: 190 cm/s, phasic            Left: 75 cm/s, phasic            Extension: 92 cm/s, phasic            Flexion: 81 cm/s, phasic        PPGs:            Baseline: Normal            Arms 90: Normal            Arms 180: Severely dampened, flattened            : Normal             head right: Normal             head left: Normal            Onset: Normal  Impression: IMPRESSION: Thoracic outlet/inlet physiology suggested. Correlate for  symptoms.    1. RIGHT:       A. No subclavian venous stenosis suggested at rest.       B. increased velocities and turbulent flow in the subclavian vein  with maneuvers without cessation of flow.       C. Patent right internal jugular vein with increased velocities  during various maneuvers, without cessation of flow.       D. Loss of PPG waveform/severe dampening at 180 degrees arm  abduction, nonspecific. Remainder of maneuvers do not dampen  PPG  waveforms.    2. LEFT:       A. No subclavian venous stenosis suggested at rest.       B. increased velocities and turbulent flow in the subclavian vein  with maneuvers without cessation of flow.       C. Patent left internal jugular vein with increased velocities  during various maneuvers, without cessation of flow.       D. Loss of PPG waveform/severe dampening at 180 degrees arm  abduction, nonspecific. Remainder of maneuvers do not dampen PPG  waveforms.  .    I have personally reviewed the examination and initial interpretation  and I agree with the findings.    ELSA TAMAYO MD         SYSTEM ID:  [id number]  CTV Head w Contrast  Narrative: CTV HEAD NECK WITH CONTRAST 5/12/2022 9:22 AM    History:  71F with head extension induced vertigo with downbeat  nystagmus. Question cerebellar degeneration, venous stenosis, styloid  length,.; Compression of vein; Nystagmus; TOS (thoracic outlet  syndrome)  ICD-10: Compression of vein; Nystagmus; TOS (thoracic outlet syndrome)     Comparison: Brain MRI 9/15/2021, 5/1/2008      Technique: Helically acquired thin section axial CT images were  obtained with 1 mm collimation through the brain after intravenous  bolus injection of iodinated contrast medium with an approximately  20-25 second delay between administration of contrast and scanning.  Image data were sent to the [employer] 3D workstation and postprocessed by  the radiologist using maximum intensity pixel (MIP), multiplanar and  volume rendered 3D reconstruction programs.     Contrast: Isovue 370 75cc    Findings: Diminutive size of the left transverse, sigmoid, and  internal jugular vein relative to the right. The left internal jugular  vein then is markedly narrowed between the styloid process and  transverse process of C1 which also persists on the extension images.  No intraluminal filling defects visualized intracranially or  extracranially. Bilateral subclavian and axillary veins are widely  patent without  evidence for stenosis with the arms down.    There is no mass effect, midline shift, or  intracranial hemorrhage.   The ventricles are proportionate to the cerebral sulci. The gray-white  matter differentiation of the cerebral hemispheres is preserved.  Postcontrast images demonstrate no abnormal intracranial parenchymal  or meningeal enhancement. Moderate diffuse cerebral atrophy. Unchanged  asymmetric volume loss in the right parasagittal occipital lobe.     The orbits, visualized portions of paranasal sinuses, and mastoid air  cells are relatively clear.     Scattered multilevel cervical spondylosis without significant spinal  canal or neuroforaminal narrowing. No suspicious sclerotic or lucent  osseous lesion.    Cervical soft tissues are unremarkable. Thyroid is unremarkable.    Partially visualized left chest wall ICD/Pacemaker.  Impression: Impression:    1. 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 Significance is uncertain given  the small size of this vein inferiorly. No intraluminal thrombus or  significant extraluminal stenosis in either the intracranial or  extracranial veins.  2. No CT evidence for thoracic outlet syndrome.  3. Moderate diffuse cerebral volume loss, likely sequelae of chronic  small vessel ischemic disease.    I have personally reviewed the examination and initial interpretation  and I agree with the findings.    ROCÍO TORRES MD         SYSTEM ID:  [id number]    35-minutes were spent in evaluation and counseling as well as documentation on the date of service. After a review of the patient s situation, this visit was changed from an in-person visit to a video visit to reduce the risk of COVID-19 exposure.

## 2022-08-26 NOTE — TELEPHONE ENCOUNTER
CAlled pt and rescheduled her for Oct 7 at 10am and cancelled her December appointment.  Pt states sometimes her speech is slurred and her balance is very bad. She does not have headaches and hasn't fallen. She has problems sleeping but no problems with bowel or bladder habits. She had physical therapy in the spring of 2022 but she doesn't think it did any good.        [facility] Call Center    Phone Message    May a detailed message be left on voicemail: yes     Reason for Call: Appointment Intake    Referring Provider Name: Ataxia [R27.0]  Cerebellar atrophy   Diagnosis and/or Symptoms: Ataxia [R27.0]  Cerebellar atrophy     Pt called to schedule her Ataxia appt per referral, writer able to schedule first available 12/2/22 and added to wait list.  Unfortunately this is not in the timeline of the referral or protocols.    Please call Pt at [phone] to discuss further.    Action Taken: Message routed to:  [facility] ([facility] Ataxia Neurology    Travel Screening: Not Applicable

## 2022-08-26 NOTE — Clinical Note
August 26, 2022       TO: Pat Barclay  [address] [place] [address] [address]  [place] [address]       DearMs.Deny,    We are writing to inform you of your test results.    {p results letter list:[id number]}    No results found from the In Basket message.    ***

## 2022-08-26 NOTE — LETTER
8/26/2022     RE: Pat Barclay  [address] [address]  [place] [address]     Dear Colleague,    Thank you for referring your patient, Pat Barclay, to the [facility] NEUROLOGY CLINIC [place] at [facility]. Please see a copy of my visit note below.    The patient is being evaluated via a billable video visit.    How would you like to obtain your AVS? MyChart  If the video visit is dropped, the invitation should be resent by: Send to e-mail at: [email or web address]  Will anyone else be joining your video visit? No      Video-Visit Details  Type of service:  Video Visit  Video Start Time:3:11 PM  Video End Time:3:36 PM  Originating Location (pt. 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 On exam she has downbeat nystagmus that can be elicited with head extension in all positions (right, center, left) which is associated with her vertigo. She has severely impaired smooth pursuit but no gaze-evoked nystagmus. This pattern is not typical of BPPV but is concerning for a central process, like a cerebellar degeneration or a lesion in the posterior fossa.      Because of her pace-maker, we started with a CT venogram so that we can see the vasculature at the skull base, which can be compressed with head extension. There is also a mild left sided dysmetria which also points to a cerebellar lesion. There is a flavor of syndromic-ness to her picture given the severe hearing loss, white matter disease, bradycardia, and now the central eye movement abnormality and ataxia.       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 The volume loss was so severe that it almost looked like a cyst. There is also loss of volume of the right occipital lobe and bilateral parietal lobes.     Interim History 8-26-22:  Trial of 4-AP, titrated to 10mg BID. She has just started on the goal dose. She has been on this for a little more than a month. When she gets up she feels lightheadedness. There is no vertigo. There is some sense of disorientation. There is no sense of oscillopsia. The trigger can be noise, standing up from sitting, head extension. If she is not moving her head, she feels okay. Her symptoms are only triggered by head movement. All direction of head movement are equally as bad. They are triggered only when she is standing up. She is fine moving her head when sitting or lying down. There is no headache or head pressure. No ear pain or ear pressure. There is sometimes ringing in the ears. There is no neck pain or tightness. There is no chest pain. No pelvic or abdominal pressure. There is no family history of similar problems.     She is not having any falls. She feels like she is \"running\" when she walks because her feet go faster than she wants them to. She does not endorse freezing of gait. She tends to walk in a zig-zag way. She walks like she is drunk.     She has mild dysarthria. Patient denies any visual problems.   Paraneoplastic panel, Anti-KRYSTA, Vitamin E were all normal 6-13-22    Impression: Potentially a syndrome of cerebellar, biparietal atrophy, white matter disease, hearing loss, bradycardia, with additional component of left internal jugular " compression at the skull base. Before considering more tests for ataxia, I'd like to obtain  from my colleague Jc Araya, an ataxia specialist, so that we can proceed more efficiently. In the meantime, she should continue with the 4-AP as she has just reached the goal dose.      Plan:   1. Continue with the 4-AP 10mg BID  2. Referral to Jc Araya, Ataxia Clinic regarding evaluation for genetic or other neurodegeneration disorders of ataxia given caudal cerebellum, parietal lobe, and occipital lobe atrophy.      DATA:  I personally reviewed the following data.    Last brain imaging:          US Up Ex Art & Josafat Thor Outlet Syn Bilat  Narrative: THORACIC INLET/OUTLET DUPLEX ULTRASOUND 5/12/2022 10:56 AM    CLINICAL HISTORY: 71F with forehead pressure and vertigo with head  extension.; Compression of vein; TOS (thoracic outlet syndrome).    COMPARISONS: None available.    REFERRING PROVIDER: GISELLE COKER CHA    TECHNIQUE: Bilateral innominate, subclavian, and axillary veins were  evaluated grayscale, color Doppler, Doppler waveform ultrasound.  Bilateral subclavian veins were evaluated with color Doppler and  Doppler waveform imaging through abduction maneuvers.    Bilateral index finger PPG's obtained at rest and with provocative  positions.    Bilateral internal jugular veins evaluated at rest with grayscale,  color Doppler, and Doppler waveform ultrasound. Bilateral internal  jugular veins evaluated with color Doppler and Doppler waveform  ultrasound through maneuvers.    FINDINGS:  RIGHT:       REST:            INTERNAL JUGULAR VEIN: 54 cm/s, phasic, fully compressible            INNOMINATE VEIN: 25 cm/s, phasic            SUBCLAVIAN VEIN, medial: 42 cm/s, phasic            SUBCLAVIAN VEIN, mid: 31 cm/s, phasic, fully compressible            SUBCLAVIAN VEIN, lateral: 73 cm/s, phasic, fully  compressible            AXILLARY VEIN: 42 cm/s, phasic, fully compressible         MID SUBCLAVIAN VEIN, sitting  erect:            0 degrees: 91 cm/s, phasic            90 degrees: 166 cm/s, phasic            135 degrees: 236 cm/s, phasic            180 degrees: 209 cm/s, phasic         INTERNAL JUGULAR VEIN, sitting erect:            Neutral: 145 cm/s, phasic            Right: 168 cm/s, phasic            Left: 166 cm/s, phasic            Extension: 154 cm/s, phasic            Flexion: 126 cm/s, phasic         PPGs:            Baseline: Normal            Arms 90: Normal            Arms 180: Severely dampened, flattened              : Normal             head right: Normal             head left: Normal            Onset: Normal    LEFT:       REST:            INTERNAL JUGULAR VEIN: 99 cm/s, phasic, fully compressible            INNOMINATE VEIN: 32 cm/s, phasic            SUBCLAVIAN VEIN, medial: 79 cm/s, phasic            SUBCLAVIAN VEIN, mid: 56 cm/s, phasic, fully compressible            SUBCLAVIAN VEIN, lateral: 36 cm/s, phasic, fully  compressible            AXILLARY VEIN: 53 cm/s, phasic, fully compressible         MID SUBCLAVIAN VEIN, sitting erect:            0 degrees: 28 cm/s, phasic            90 degrees: 124 cm/s, phasic,             135 degrees: 137 cm/s, phasic            180 degrees: 228 cm/s, phasic         INTERNAL JUGULAR VEIN, sitting erect:            Neutral: 97 cm/s, phasic            Right: 190 cm/s, phasic            Left: 75 cm/s, phasic            Extension: 92 cm/s, phasic            Flexion: 81 cm/s, phasic        PPGs:            Baseline: Normal            Arms 90: Normal            Arms 180: Severely dampened, flattened            : Normal             head right: Normal             head left: Normal            Onset: Normal  Impression: IMPRESSION: Thoracic outlet/inlet physiology suggested. Correlate for  symptoms.    1. RIGHT:       A. No subclavian venous stenosis suggested at rest.       B. increased velocities and turbulent flow in the subclavian  vein  with maneuvers without cessation of flow.       C. Patent right internal jugular vein with increased velocities  during various maneuvers, without cessation of flow.       D. Loss of PPG waveform/severe dampening at 180 degrees arm  abduction, nonspecific. Remainder of maneuvers do not dampen PPG  waveforms.    2. LEFT:       A. No subclavian venous stenosis suggested at rest.       B. increased velocities and turbulent flow in the subclavian vein  with maneuvers without cessation of flow.       C. Patent left internal jugular vein with increased velocities  during various maneuvers, without cessation of flow.       D. Loss of PPG waveform/severe dampening at 180 degrees arm  abduction, nonspecific. Remainder of maneuvers do not dampen PPG  waveforms.  .    I have personally reviewed the examination and initial interpretation  and I agree with the findings.    ELSA TAMAYO MD         SYSTEM ID:  [id number]  CTV Head w Contrast  Narrative: CTV HEAD NECK WITH CONTRAST 5/12/2022 9:22 AM    History:  71F with head extension induced vertigo with downbeat  nystagmus. Question cerebellar degeneration, venous stenosis, styloid  length,.; Compression of vein; Nystagmus; TOS (thoracic outlet  syndrome)  ICD-10: Compression of vein; Nystagmus; TOS (thoracic outlet syndrome)     Comparison: Brain MRI 9/15/2021, 5/1/2008      Technique: Helically acquired thin section axial CT images were  obtained with 1 mm collimation through the brain after intravenous  bolus injection of iodinated contrast medium with an approximately  20-25 second delay between administration of contrast and scanning.  Image data were sent to the Flow Studio 3D workstation and postprocessed by  the radiologist using maximum intensity pixel (MIP), multiplanar and  volume rendered 3D reconstruction programs.     Contrast: Isovue 370 75cc    Findings: Diminutive size of the left transverse, sigmoid, and  internal jugular vein relative to the right. The  left internal jugular  vein then is markedly narrowed between the styloid process and  transverse process of C1 which also persists on the extension images.  No intraluminal filling defects visualized intracranially or  extracranially. Bilateral subclavian and axillary veins are widely  patent without evidence for stenosis with the arms down.    There is no mass effect, midline shift, or  intracranial hemorrhage.   The ventricles are proportionate to the cerebral sulci. The gray-white  matter differentiation of the cerebral hemispheres is preserved.  Postcontrast images demonstrate no abnormal intracranial parenchymal  or meningeal enhancement. Moderate diffuse cerebral atrophy. Unchanged  asymmetric volume loss in the right parasagittal occipital lobe.     The orbits, visualized portions of paranasal sinuses, and mastoid air  cells are relatively clear.     Scattered multilevel cervical spondylosis without significant spinal  canal or neuroforaminal narrowing. No suspicious sclerotic or lucent  osseous lesion.    Cervical soft tissues are unremarkable. Thyroid is unremarkable.    Partially visualized left chest wall ICD/Pacemaker.  Impression: Impression:    1. 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 Significance is uncertain given  the small size of this vein inferiorly. No intraluminal thrombus or  significant extraluminal stenosis in either the intracranial or  extracranial veins.  2. No CT evidence for thoracic outlet syndrome.  3. Moderate diffuse cerebral volume loss, likely sequelae of chronic  small vessel ischemic disease.    I have personally reviewed the examination and initial interpretation  and I agree with the findings.    ROCÍO TORRES MD    SYSTEM ID:  [id number]    35-minutes were spent in  evaluation and counseling as well as documentation on the date of service. After a review of the patient s situation, this visit was changed from an in-person visit to a video visit to reduce the risk of COVID-19 exposure.      Again, thank you for allowing me to participate in the care of your patient.      Sincerely,    Elva PEDROZA Cha, MD

## 2022-08-30 NOTE — TELEPHONE ENCOUNTER
RECORDS RECEIVED FROM: internal   REASON FOR VISIT: ataxia   Date of Appt: 10/7/22   NOTES (FOR ALL VISITS) STATUS DETAILS   OFFICE NOTE from referring provider Internal Dr Segundo @ [facility] Neurology:  8/26/22 5/27/22 4/27/22   OFFICE NOTE from other specialist Care Everywhere Catie Puga NP @ [facility] Neurology:  10/25/21   MEDICATION LIST Internal    IMAGING  (FOR ALL VISITS)     MRI (HEAD, NECK, SPINE) Received [facility]:  MRI Brain 9/15/21  MRI Brain 5/1/08  MRI Brain 11/5/03  MRI Brain 8/22/01

## 2022-10-03 ENCOUNTER — HEALTH MAINTENANCE LETTER (OUTPATIENT)
Age: 72
End: 2022-10-03

## 2022-10-07 ENCOUNTER — PRE VISIT (OUTPATIENT)
Dept: NEUROLOGY | Facility: CLINIC | Age: 72
End: 2022-10-07

## 2022-10-07 ENCOUNTER — OFFICE VISIT (OUTPATIENT)
Dept: NEUROLOGY | Facility: CLINIC | Age: 72
End: 2022-10-07
Attending: PSYCHIATRY & NEUROLOGY
Payer: MEDICARE

## 2022-10-07 ENCOUNTER — LAB (OUTPATIENT)
Dept: LAB | Facility: CLINIC | Age: 72
End: 2022-10-07

## 2022-10-07 VITALS
WEIGHT: 148 LBS | HEART RATE: 77 BPM | SYSTOLIC BLOOD PRESSURE: 91 MMHG | DIASTOLIC BLOOD PRESSURE: 61 MMHG | OXYGEN SATURATION: 98 %

## 2022-10-07 DIAGNOSIS — G31.9 CEREBELLAR ATROPHY (H): ICD-10-CM

## 2022-10-07 DIAGNOSIS — R27.0 ATAXIA: Primary | ICD-10-CM

## 2022-10-07 DIAGNOSIS — I95.1 ORTHOSTATIC HYPOTENSION: ICD-10-CM

## 2022-10-07 DIAGNOSIS — R27.0 ATAXIA: ICD-10-CM

## 2022-10-07 PROCEDURE — 86376 MICROSOMAL ANTIBODY EACH: CPT | Performed by: PSYCHIATRY & NEUROLOGY

## 2022-10-07 PROCEDURE — 36415 COLL VENOUS BLD VENIPUNCTURE: CPT | Performed by: PATHOLOGY

## 2022-10-07 PROCEDURE — 84432 ASSAY OF THYROGLOBULIN: CPT | Mod: 91 | Performed by: PSYCHIATRY & NEUROLOGY

## 2022-10-07 PROCEDURE — 99207 PR NO CHARGE LOS MISSING/LACKING DOCUMENTATION: CPT | Performed by: PSYCHIATRY & NEUROLOGY

## 2022-10-07 PROCEDURE — 86364 TISS TRNSGLTMNASE EA IG CLAS: CPT | Performed by: PSYCHIATRY & NEUROLOGY

## 2022-10-07 PROCEDURE — 86800 THYROGLOBULIN ANTIBODY: CPT | Performed by: PSYCHIATRY & NEUROLOGY

## 2022-10-07 PROCEDURE — 99207 PR STATISTIC GENETIC COUNSELING, <16 MIN: CPT | Performed by: GENETIC COUNSELOR, MS

## 2022-10-07 ASSESSMENT — PAIN SCALES - GENERAL: PAINLEVEL: NO PAIN (0)

## 2022-10-07 NOTE — LETTER
10/7/2022       RE: Pat Barclay  [address] [place] [address] [address]  [place] [address]     Dear Colleague,    Thank you for referring your patient, Pat Barclay, to the [facility] NEUROLOGY CLINIC [place] at [facility]. Please see a copy of my visit note below.    Genetic Counseling  Pat Barclay was seen today for a genetic consult was accompanied to the appointment by her , Nate. I spent a total of 20 minutes with the patient with the majority of the time being spent on genetic counseling and testing options. The patient also underwent a neurological examination from [facility] after our visit.      MEDICAL HISTORY  Please see Dr. Araya and Debbie's notes for a more thorough summary of medical history. Briefly, Pat reports a history of issues with balance.     FAMILY HISTORY-see scanned pedigree  1. Pat reported that her son  at age 28 from unexplained cardiomyopathy  2. No family history of neurologic disease was reported.        GENETIC COUNSELING   We discussed the genetic and environmental basis of neurologic disease. I explained that her later age of onset and lack of clear family history would argue against a single gene cause for her symptoms.          PLAN  Family history was reviewed with Dr. Araya and genetic testing was not pursued.    Levy Kelley MS, [facility]  Certified Genetic Counselor  [facility] Adult Neurology and Ataxia Clinics

## 2022-10-07 NOTE — PROGRESS NOTES
"Department of Neurology  Movement Disorders Division   Ataxia Clinic  New Patient Visit    Patient: Pat Barclay   MRN: [id number]   : 1950   Date of Visit: 10/7/2022  PCP: Jia Adler MD   Referring provider: Elva Segundo MD    CC:  Ataxic gait    HPI:  Ms. Barclay is a 72 year old right-handed female with history significant for progressive cerebral atrophy & WMD, bilateral hearing loss who presents to movement disorder clinic for ataxic gait. She is accompanied by her .    She reports that she noticed acute worsening of dizziness last year. She had had this for many years before (maybe ) but now, if she would look up quickly she feels \"off\" and \"shut down\". She feels a bit faint and like she's spinning. This started being more constant last year, not just with looking quickly. When it's bad, she'll lay down for an hour or so and then feel better. No accompanying headache or emesis. No falls.     She notices tingling in both feet since a couple of surgeries and she had a right drop foot which prompted her surgeries. After her first surgery this improved, but she had more pain prompting revisions. First surgery in  and most recent was . She thinks this is contributing to her balance problems but also notices in the past year that she's been \"walking like a drunken \". She feels like she always has to steady herself with standing. She does monitor her blood pressure and notices that it's always low.     She has not noticed coordination issues with her hands, although thinks handwriting has worsened a bit.    She feels like she's yelling when she talks but told she isn't. No other speech changes or swallowing difficulties. No double vision.     She is currently being seen by neuro-otologist, Dr. Segundo, who has started her on 4-AP. She does think that this has helped with her dizziness. It's less intense than it used to be.     She has worked with physical therapy.    MEDICATIONS " reviewed & pertinent for:  4-AP  Citalopram   Cyclobenzaprine   Fludrocortisone  Meclizine     ROS:  All others negative except as listed above.    Past Medical History:   Diagnosis Date     Cardiac pacemaker in situ      Dizziness      Hypothyroidism      Orthostatic hypotension          Past Surgical History:   Procedure Laterality Date     BACK SURGERY      1989          Current Outpatient Medications:      NONFORMULARY, Take 10 mg by mouth 2 times daily, Disp: , Rfl:      cholecalciferol (VITAMIN D3) 25 mcg (1000 units) capsule, Take 1,000 Units by mouth daily, Disp: , Rfl:      citalopram (CELEXA) 10 MG tablet, Take 1 tablet by mouth See Admin Instructions (Patient not taking: No sig reported), Disp: , Rfl:      estrogen conj-medroxyPROGESTERone (PREMPRO) 0.3-1.5 MG tablet, Take 1 tablet by mouth See Admin Instructions, Disp: , Rfl:      HYDROcodone-acetaminophen (NORCO) 5-325 MG tablet, Take 1 tablet by mouth See Admin Instructions (Patient not taking: No sig reported), Disp: , Rfl:      levothyroxine (SYNTHROID/LEVOTHROID) 50 MCG tablet, Take 1 tablet by mouth daily (with dinner), Disp: , Rfl:      traZODone (DESYREL) 50 MG tablet, Take 1 tablet by mouth See Admin Instructions, Disp: , Rfl:      Allergies   Allergen Reactions     Naproxen Shortness Of Breath     Throat swelling       Nsaids Rash     Adhesive Tape Other (See Comments)     Not sure if its paper or other tape, past reaction with some sort of prolonged tape that blistered skin. Try to avoid tape if possible.     Oxycodone Other (See Comments)     intolerance        No family history of ataxia, early wheelchair use, falls, coordination issues, Parkinson's disease, or dementia    Social History:  She  reports that she has never smoked. She has never used smokeless tobacco. She reports current alcohol use. She reports that she does not use drugs. No history of regular heavy alcohol use. Previously worked in [employer]     PHYSICAL  EXAM:  Pulse 77, blood pressure 91/61, O2 98%    Gen: alert, active, attentive, appropriately groomed     NEURO:  MS: Alert and oriented to person, place, time, and situation.  Speech normal to comprehension.  Recent and remote memory intact.  Attention and concentration normal.  Fund of knowledge normal.    CN:  Pupils equal, round, and reactive to light. VFF. EOM normal range, no nystagmus.  Normal saccades.  Face symmetric at rest and with activation. Hearing aids in place bilaterally, hearing intact to conversation. Palate rise b/l, uvula midline.  No dysarthria. Trapezius and SCM 5/5 bilaterally. Tongue protrudes midline. No fasciculation or atrophy noted.    Motor:  Normal bulk and tone throughout upper and lower extremities.  No abnormal movements or fasciculations observed. Strength is 5/5 throughout and symmetric with the exception of right ankle dorsiflexion 4+/5.    Sensation:  Intact to LT, vibration, and temperature in all extremities (>10 sec vibration in toes)    Coordination:  FTN and HTN intact bilaterally.    Gait:  Slow narrow based gait. Decreased arm swing R > L. Able to stand with feet together & in tandem pose x 10 sec. Unable to complete tandem gait.    LABS:  6/13/22:  - vitamin E within normal limits   - KRYSTA antibody slightly elevated 0.12  - Enloe serum paraneoplastic panel negative    IMAGING/STUDIES:  CTV head/neck 5/12/22  Impression:    1. 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 Significance is uncertain given  the small size of this vein inferiorly. No intraluminal thrombus or  significant extraluminal stenosis in either the intracranial or  extracranial veins.  2. No CT evidence for thoracic outlet syndrome.  3. Moderate diffuse cerebral volume loss, likely sequelae of chronic  small  vessel ischemic disease.    MRI brain 9/15/2021  CONCLUSION:  1. No acute intracranial pathology or evidence of mass.  2. Mild parenchymal volume loss and moderate supratentorial white matter/pontine hyperintensities, consistent with areas of chronic microvascular ischemic change. Volume loss and chronic microvascular ischemic changes have progressed as compared to the 2008 study.  3. Ventricles are mildly dilated, proportional to sulci, likely a result of central atrophy.  4. Mild bilateral temporomandibular joint arthropathy.  5. Small left mastoid fluid.    ASSESSMENT:  Ms. Barclay is a 72 year old right-handed female with history significant for progressive cerebral atrophy & WMD, bilateral hearing loss who presents to movement disorder clinic for ataxic gait. Her exam today was not notable for ataxia and it's unclear what may have changed since her previous neurologic exam. Her imaging is notable for cerebral and cerebellar atrophy of unclear etiology so will evaluate for reversible causes (autoimmune). I suspect some aspect of orthostatic hypotension is contributing to her dizziness and she was hypotensive today, so will refer to autonomic dysfunction clinic.    PLAN:  - continue to follow with Dr. Segundo for dizziness  - referral to autonomic dysfunction clinic  - start aspirin 81 mg for stroke prevention  - labs: TPO antibody, thyroglobulin antibody, TTG antibodies    RTC as needed     Maisha Lewis MD  Movement Disorders Fellow

## 2022-10-07 NOTE — PROGRESS NOTES
Genetic Counseling  Pat Barclay was seen today for a genetic consult was accompanied to the appointment by her , Nate. I spent a total of 20 minutes with the patient with the majority of the time being spent on genetic counseling and testing options. The patient also underwent a neurological examination from [facility] after our visit.      MEDICAL HISTORY  Please see Dr. Araya and Debbie's notes for a more thorough summary of medical history. Briefly, Pat reports a history of issues with balance.     FAMILY HISTORY-see scanned pedigree  1. Pat reported that her son  at age 28 from unexplained cardiomyopathy  2. No family history of neurologic disease was reported.        GENETIC COUNSELING   We discussed the genetic and environmental basis of neurologic disease. I explained that her later age of onset and lack of clear family history would argue against a single gene cause for her symptoms.          PLAN  Family history was reviewed with Dr. Araya and genetic testing was not pursued.    Levy Kelley MS, [facility]  Certified Genetic Counselor  [facility] Adult Neurology and Ataxia Clinics

## 2022-10-07 NOTE — PATIENT INSTRUCTIONS
"We will get some additional labs to evaluate for autoimmune causes of cerebellar atrophy.    Continue to follow with Dr. Segundo for your dizziness.    I recommend that you take an aspirin for stroke prevention. The white spots on your imaging could be caused by small strokes so it's important to prevent it.    We will refer you for management of your low blood pressure (this will be a clinic for \"autonomic dysfunction\"). You will get a call to schedule it. You can also ask your primary care physician if there is a clinic for this in [place].   "

## 2022-10-07 NOTE — LETTER
"10/7/2022      RE: Pta Barclay  [address] [facility] [address]  [place] [address]       Department of Neurology  Movement Disorders Division   Ataxia Clinic  New Patient Visit    Patient: Pat Barclay   MRN: [id number]   : 1950   Date of Visit: 10/7/2022  PCP: Jia Adler MD   Referring provider: Elva Segundo MD    CC:  Ataxic gait    HPI:  Ms. Barclay is a 72 year old right-handed female with history significant for progressive cerebral atrophy & WMD, bilateral hearing loss who presents to movement disorder clinic for ataxic gait. She is accompanied by her .    She reports that she noticed acute worsening of dizziness last year. She had had this for many years before (maybe ) but now, if she would look up quickly she feels \"off\" and \"shut down\". She feels a bit faint and like she's spinning. This started being more constant last year, not just with looking quickly. When it's bad, she'll lay down for an hour or so and then feel better. No accompanying headache or emesis. No falls.     She notices tingling in both feet since a couple of surgeries and she had a right drop foot which prompted her surgeries. After her first surgery this improved, but she had more pain prompting revisions. First surgery in  and most recent was . She thinks this is contributing to her balance problems but also notices in the past year that she's been \"walking like a drunken \". She feels like she always has to steady herself with standing. She does monitor her blood pressure and notices that it's always low.     She has not noticed coordination issues with her hands, although thinks handwriting has worsened a bit.    She feels like she's yelling when she talks but told she isn't. No other speech changes or swallowing difficulties. No double vision.     She is currently being seen by neuro-otologist, Dr. Segundo, who has started her on 4-AP. She does think that this has helped with her dizziness. It's less intense " than it used to be.     She has worked with physical therapy.    MEDICATIONS reviewed & pertinent for:  4-AP  Citalopram   Cyclobenzaprine   Fludrocortisone  Meclizine     ROS:  All others negative except as listed above.    Past Medical History:   Diagnosis Date     Cardiac pacemaker in situ      Dizziness      Hypothyroidism      Orthostatic hypotension          Past Surgical History:   Procedure Laterality Date     BACK SURGERY      1989          Current Outpatient Medications:      NONFORMULARY, Take 10 mg by mouth 2 times daily, Disp: , Rfl:      cholecalciferol (VITAMIN D3) 25 mcg (1000 units) capsule, Take 1,000 Units by mouth daily, Disp: , Rfl:      citalopram (CELEXA) 10 MG tablet, Take 1 tablet by mouth See Admin Instructions (Patient not taking: No sig reported), Disp: , Rfl:      estrogen conj-medroxyPROGESTERone (PREMPRO) 0.3-1.5 MG tablet, Take 1 tablet by mouth See Admin Instructions, Disp: , Rfl:      HYDROcodone-acetaminophen (NORCO) 5-325 MG tablet, Take 1 tablet by mouth See Admin Instructions (Patient not taking: No sig reported), Disp: , Rfl:      levothyroxine (SYNTHROID/LEVOTHROID) 50 MCG tablet, Take 1 tablet by mouth daily (with dinner), Disp: , Rfl:      traZODone (DESYREL) 50 MG tablet, Take 1 tablet by mouth See Admin Instructions, Disp: , Rfl:      Allergies   Allergen Reactions     Naproxen Shortness Of Breath     Throat swelling       Nsaids Rash     Adhesive Tape Other (See Comments)     Not sure if its paper or other tape, past reaction with some sort of prolonged tape that blistered skin. Try to avoid tape if possible.     Oxycodone Other (See Comments)     intolerance        No family history of ataxia, early wheelchair use, falls, coordination issues, Parkinson's disease, or dementia    Social History:  She  reports that she has never smoked. She has never used smokeless tobacco. She reports current alcohol use. She reports that she does not use drugs. No history of regular heavy  alcohol use. Previously worked in [employer].     PHYSICAL EXAM:  Pulse 77, blood pressure 91/61, O2 98%    Gen: alert, active, attentive, appropriately groomed     NEURO:  MS: Alert and oriented to person, place, time, and situation.  Speech normal to comprehension.  Recent and remote memory intact.  Attention and concentration normal.  Fund of knowledge normal.    CN:  Pupils equal, round, and reactive to light. VFF. EOM normal range, no nystagmus.  Normal saccades.  Face symmetric at rest and with activation. Hearing aids in place bilaterally, hearing intact to conversation. Palate rise b/l, uvula midline.  No dysarthria. Trapezius and SCM 5/5 bilaterally. Tongue protrudes midline. No fasciculation or atrophy noted.    Motor:  Normal bulk and tone throughout upper and lower extremities.  No abnormal movements or fasciculations observed. Strength is 5/5 throughout and symmetric with the exception of right ankle dorsiflexion 4+/5.    Sensation:  Intact to LT, vibration, and temperature in all extremities (>10 sec vibration in toes)    Coordination:  FTN and HTN intact bilaterally.    Gait:  Slow narrow based gait. Decreased arm swing R > L. Able to stand with feet together & in tandem pose x 10 sec. Unable to complete tandem gait.    LABS:  6/13/22:  - vitamin E within normal limits   - KRYSTA antibody slightly elevated 0.12  - [place] serum paraneoplastic panel negative    IMAGING/STUDIES:  CTV head/neck 5/12/22  Impression:    1. 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 Significance is uncertain given  the small size of this vein inferiorly. No intraluminal thrombus or  significant extraluminal stenosis in either the intracranial or  extracranial veins.  2. No CT evidence for thoracic outlet syndrome.  3.  Moderate diffuse cerebral volume loss, likely sequelae of chronic  small vessel ischemic disease.    MRI brain 9/15/2021  CONCLUSION:  1. No acute intracranial pathology or evidence of mass.  2. Mild parenchymal volume loss and moderate supratentorial white matter/pontine hyperintensities, consistent with areas of chronic microvascular ischemic change. Volume loss and chronic microvascular ischemic changes have progressed as compared to the 2008 study.  3. Ventricles are mildly dilated, proportional to sulci, likely a result of central atrophy.  4. Mild bilateral temporomandibular joint arthropathy.  5. Small left mastoid fluid.    ASSESSMENT:  Ms. Barclay is a 72 year old right-handed female with history significant for progressive cerebral atrophy & WMD, bilateral hearing loss who presents to movement disorder clinic for ataxic gait. Her exam today was not notable for ataxia and it's unclear what may have changed since her previous neurologic exam. Her imaging is notable for cerebral and cerebellar atrophy of unclear etiology so will evaluate for reversible causes (autoimmune). I suspect some aspect of orthostatic hypotension is contributing to her dizziness and she was hypotensive today, so will refer to autonomic dysfunction clinic.    PLAN:  - continue to follow with Dr. Segundo for dizziness  - referral to autonomic dysfunction clinic  - start aspirin 81 mg for stroke prevention  - labs: TPO antibody, thyroglobulin antibody, TTG antibodies    RTC as needed     Maisha Lewis MD  Movement Disorders Fellow       Diann Araya MD

## 2022-10-07 NOTE — LETTER
"10/7/2022       RE: Pat Barclay  [address]  [place] [address]     Dear Colleague,    Thank you for referring your patient, Pat Barclay, to the [facility]UROLOGY [facility] [place] at [facility]. Please see a copy of my visit note below.    Department of Neurology  Movement Disorders Division   Ataxia Clinic  New Patient Visit    Patient: Pat Barclay   MRN: [id number]   : 1950   Date of Visit: 10/7/2022  PCP: Jia Adler MD   Referring provider: Elva Segundo MD    CC:  Ataxic gait    HPI:  Ms. Barclay is a 72 year old right-handed female with history significant for progressive cerebral atrophy & WMD, bilateral hearing loss who presents to movement disorder clinic for ataxic gait. She is accompanied by her .    She reports that she noticed acute worsening of dizziness last year. She had had this for many years before (maybe ) but now, if she would look up quickly she feels \"off\" and \"shut down\". She feels a bit faint and like she's spinning. This started being more constant last year, not just with looking quickly. When it's bad, she'll lay down for an hour or so and then feel better. No accompanying headache or emesis. No falls.     She notices tingling in both feet since a couple of surgeries and she had a right drop foot which prompted her surgeries. After her first surgery this improved, but she had more pain prompting revisions. First surgery in  and most recent was . She thinks this is contributing to her balance problems but also notices in the past year that she's been \"walking like a drunken \". She feels like she always has to steady herself with standing. She does monitor her blood pressure and notices that it's always low.     She has not noticed coordination issues with her hands, although thinks handwriting has worsened a bit.    She feels like she's yelling when she talks but told she isn't. No " other speech changes or swallowing difficulties. No double vision.     She is currently being seen by neuro-otologist, Dr. Segundo, who has started her on 4-AP. She does think that this has helped with her dizziness. It's less intense than it used to be.     She has worked with physical therapy.    MEDICATIONS reviewed & pertinent for:  4-AP  Citalopram   Cyclobenzaprine   Fludrocortisone  Meclizine     ROS:  All others negative except as listed above.    Past Medical History:   Diagnosis Date     Cardiac pacemaker in situ      Dizziness      Hypothyroidism      Orthostatic hypotension          Past Surgical History:   Procedure Laterality Date     BACK SURGERY      1989          Current Outpatient Medications:      NONFORMULARY, Take 10 mg by mouth 2 times daily, Disp: , Rfl:      cholecalciferol (VITAMIN D3) 25 mcg (1000 units) capsule, Take 1,000 Units by mouth daily, Disp: , Rfl:      citalopram (CELEXA) 10 MG tablet, Take 1 tablet by mouth See Admin Instructions (Patient not taking: No sig reported), Disp: , Rfl:      estrogen conj-medroxyPROGESTERone (PREMPRO) 0.3-1.5 MG tablet, Take 1 tablet by mouth See Admin Instructions, Disp: , Rfl:      HYDROcodone-acetaminophen (NORCO) 5-325 MG tablet, Take 1 tablet by mouth See Admin Instructions (Patient not taking: No sig reported), Disp: , Rfl:      levothyroxine (SYNTHROID/LEVOTHROID) 50 MCG tablet, Take 1 tablet by mouth daily (with dinner), Disp: , Rfl:      traZODone (DESYREL) 50 MG tablet, Take 1 tablet by mouth See Admin Instructions, Disp: , Rfl:      Allergies   Allergen Reactions     Naproxen Shortness Of Breath     Throat swelling       Nsaids Rash     Adhesive Tape Other (See Comments)     Not sure if its paper or other tape, past reaction with some sort of prolonged tape that blistered skin. Try to avoid tape if possible.     Oxycodone Other (See Comments)     intolerance        No family history of ataxia, early wheelchair use, falls, coordination issues,  Parkinson's disease, or dementia    Social History:  She  reports that she has never smoked. She has never used smokeless tobacco. She reports current alcohol use. She reports that she does not use drugs. No history of regular heavy alcohol use. Previously worked in [employer].     PHYSICAL EXAM:  Pulse 77, blood pressure 91/61, O2 98%    Gen: alert, active, attentive, appropriately groomed     NEURO:  MS: Alert and oriented to person, place, time, and situation.  Speech normal to comprehension.  Recent and remote memory intact.  Attention and concentration normal.  Fund of knowledge normal.    CN:  Pupils equal, round, and reactive to light. VFF. EOM normal range, no nystagmus.  Normal saccades.  Face symmetric at rest and with activation. Hearing aids in place bilaterally, hearing intact to conversation. Palate rise b/l, uvula midline.  No dysarthria. Trapezius and SCM 5/5 bilaterally. Tongue protrudes midline. No fasciculation or atrophy noted.    Motor:  Normal bulk and tone throughout upper and lower extremities.  No abnormal movements or fasciculations observed. Strength is 5/5 throughout and symmetric with the exception of right ankle dorsiflexion 4+/5.    Sensation:  Intact to LT, vibration, and temperature in all extremities (>10 sec vibration in toes)    Coordination:  FTN and HTN intact bilaterally.    Gait:  Slow narrow based gait. Decreased arm swing R > L. Able to stand with feet together & in tandem pose x 10 sec. Unable to complete tandem gait.    LABS:  6/13/22:  - vitamin E within normal limits   - KRYSTA antibody slightly elevated 0.12  - [place] serum paraneoplastic panel negative    IMAGING/STUDIES:  CTV head/neck 5/12/22  Impression:    1. 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  Significance is uncertain given  the small size of this vein inferiorly. No intraluminal thrombus or  significant extraluminal stenosis in either the intracranial or  extracranial veins.  2. No CT evidence for thoracic outlet syndrome.  3. Moderate diffuse cerebral volume loss, likely sequelae of chronic  small vessel ischemic disease.    MRI brain 9/15/2021  CONCLUSION:  1. No acute intracranial pathology or evidence of mass.  2. Mild parenchymal volume loss and moderate supratentorial white matter/pontine hyperintensities, consistent with areas of chronic microvascular ischemic change. Volume loss and chronic microvascular ischemic changes have progressed as compared to the 2008 study.  3. Ventricles are mildly dilated, proportional to sulci, likely a result of central atrophy.  4. Mild bilateral temporomandibular joint arthropathy.  5. Small left mastoid fluid.    ASSESSMENT:  Ms. Barclay is a 72 year old right-handed female with history significant for progressive cerebral atrophy & WMD, bilateral hearing loss who presents to movement disorder clinic for ataxic gait. Her exam today was not notable for ataxia and it's unclear what may have changed since her previous neurologic exam. Her imaging is notable for cerebral and cerebellar atrophy of unclear etiology    PLAN:  ***    RTC ***    Maisha Lewis MD  Movement Disorders Fellow       Again, thank you for allowing me to participate in the care of your patient.      Sincerely,    Diann Araya MD

## 2022-10-10 LAB
THYROGLOB AB SERPL IA-ACNC: <20 IU/ML
THYROGLOB SERPL-MCNC: NORMAL NG/DL
THYROPEROXIDASE AB SERPL-ACNC: 312 IU/ML
TTG IGA SER-ACNC: 1.4 U/ML
TTG IGG SER-ACNC: 0.8 U/ML

## 2022-10-11 ENCOUNTER — TELEPHONE (OUTPATIENT)
Dept: NEUROLOGY | Facility: CLINIC | Age: 72
End: 2022-10-11

## 2022-10-11 NOTE — TELEPHONE ENCOUNTER
[facility] Call Center    Phone Message    May a detailed message be left on voicemail: yes     Reason for Call: Other: Patient is requesting her neurology referral be faxed over to [facility] for Orthostatic hypotension. Please fax to # [phone]    Action Taken: Message routed to:  [facility] Neurology     Travel Screening: Not Applicable

## 2023-01-20 ENCOUNTER — VIRTUAL VISIT (OUTPATIENT)
Dept: NEUROLOGY | Facility: CLINIC | Age: 73
End: 2023-01-20
Payer: MEDICARE

## 2023-01-20 DIAGNOSIS — I87.1 COMPRESSION OF VEIN: ICD-10-CM

## 2023-01-20 DIAGNOSIS — R27.0 ATAXIA: ICD-10-CM

## 2023-01-20 DIAGNOSIS — H55.09 DOWNBEAT NYSTAGMUS: ICD-10-CM

## 2023-01-20 DIAGNOSIS — G31.9 CEREBELLAR ATROPHY (H): Primary | ICD-10-CM

## 2023-01-20 PROCEDURE — 99214 OFFICE O/P EST MOD 30 MIN: CPT | Mod: 95 | Performed by: PSYCHIATRY & NEUROLOGY

## 2023-01-20 RX ORDER — LEVOTHYROXINE SODIUM 50 UG/1
50 TABLET ORAL DAILY
COMMUNITY
Start: 2022-12-05

## 2023-01-20 RX ORDER — FLUDROCORTISONE ACETATE 0.1 MG/1
0.1 TABLET ORAL EVERY MORNING
COMMUNITY
Start: 2022-10-16

## 2023-01-20 RX ORDER — PSEUDOEPHEDRINE HYDROCHLORIDE 120 MG/1
1 TABLET, FILM COATED, EXTENDED RELEASE ORAL 2 TIMES DAILY PRN
COMMUNITY
Start: 2022-11-09

## 2023-01-20 NOTE — LETTER
"1/20/2023       RE: Pat Barclay  [address] [place] [address] [address]  [place] [address]     Dear Colleague,    Thank you for referring your patient, Pat Barclay, to the [facility] [place] at [facility]. Please see a copy of my visit note below.    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 On exam she has downbeat nystagmus that can be elicited with head extension in all positions (right, center, left) which is associated with her vertigo. She has severely impaired smooth pursuit but no gaze-evoked nystagmus. This pattern is not typical of BPPV but is concerning for a central process, like a cerebellar degeneration or a lesion in the posterior fossa.      Because of her pace-maker, we started with a CT venogram so that we can see the vasculature at the skull base, which can be compressed with head extension. There is also a mild left sided dysmetria which also points to a cerebellar lesion. There is a flavor of syndromic-ness to her picture given the severe hearing loss, white matter disease, bradycardia, and now the central eye movement abnormality and ataxia.       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 The volume loss was so severe that it almost looked like a cyst. There is also loss of volume of the right occipital lobe and bilateral parietal " "lobes.      Interim History 8-26-22:  Trial of 4-AP, titrated to 10mg BID. She has just started on the goal dose. She has been on this for a little more than a month. When she gets up she feels lightheadedness. There is no vertigo. There is some sense of disorientation. There is no sense of oscillopsia. The trigger can be noise, standing up from sitting, head extension. If she is not moving her head, she feels okay. Her symptoms are only triggered by head movement. All direction of head movement are equally as bad. They are triggered only when she is standing up. She is fine moving her head when sitting or lying down. There is no headache or head pressure. No ear pain or ear pressure. There is sometimes ringing in the ears. There is no neck pain or tightness. There is no chest pain. No pelvic or abdominal pressure. There is no family history of similar problems.      She is not having any falls. She feels like she is \"running\" when she walks because her feet go faster than she wants them to. She does not endorse freezing of gait. She tends to walk in a zig-zag way. She walks like she is drunk.      She has mild dysarthria. Patient denies any visual problems.   Paraneoplastic panel, Anti-KRYSTA, Vitamin E were all normal 6-13-22     Impression: Potentially a syndrome of cerebellar, biparietal atrophy, white matter disease, hearing loss, bradycardia, with additional component of left internal jugular compression at the skull base. Before considering more tests for ataxia, I'd like to obtain  from my colleague Jc Araya, an ataxia specialist, so that we can proceed more efficiently. In the meantime, she should continue with the 4-AP as she has just reached the goal dose.      Plan:   1. Continue with the 4-AP 10mg BID  2. Referral to Jc Araya, Ataxia Clinic regarding evaluation for genetic or other neurodegeneration disorders of ataxia given caudal cerebellum, parietal lobe, and occipital lobe atrophy. "      INTERIM HISTORY 1/20/2023:  10-7-22: Appt Dr. Araya.  Did not detect ataxia or downbeat nystagmus (notably, patient was on 4-AP at the time).  Added TPO antibodies which were elevated at 312 but patient has known hypothyroidism and is on levothyroxine. Transglutaminase, TSH and thyroglobulin antibodies were negative.     10-7-22: GENETIC COUNSELING  We discussed the genetic and environmental basis of neurologic disease. I explained that her later age of onset and lack of clear family history would argue against a single gene cause for her symptoms.     She has been stable overall. She was a little worse in December, listening to music and singing in [place] and felt more vertigo. She had to go home and lie down. She is better in January. She notices that being in situations where the body is shaken can make her worse.     She is taking 4-AP 10mg twice a day. She feels like disorientation on this medication. She does not have an improvement in balance. She fell twice on the snow. She does not fall in regular places indoors. She can hit the wall when she walks. She feels that her speech is fine.  There is no tremor.     There is no headache or head pressure. There are no muscle pains.     Exam: very mild downbeat nystagmus only on lateral gaze. Speech is normal. Content normal.  Plan:  1. Stay on 4-AP for 1 year. Consider trial of Ampyra if not noticing improvement  2. Autonomic function testing 2-3-23    DATA:  I personally reviewed the following data.    Last brain imaging:  US Up Ex [facility] Thor Outlet Syn Bilat  Narrative: THORACIC INLET/OUTLET DUPLEX ULTRASOUND 5/12/2022 10:56 AM    CLINICAL HISTORY: 71F with forehead pressure and vertigo with head  extension.; Compression of vein; TOS (thoracic outlet syndrome).    COMPARISONS: None available.    REFERRING PROVIDER: GISELLE COKER CHA    TECHNIQUE: Bilateral innominate, subclavian, and axillary veins were  evaluated grayscale, color Doppler, Doppler waveform  ultrasound.  Bilateral subclavian veins were evaluated with color Doppler and  Doppler waveform imaging through abduction maneuvers.    Bilateral index finger PPG's obtained at rest and with provocative  positions.    Bilateral internal jugular veins evaluated at rest with grayscale,  color Doppler, and Doppler waveform ultrasound. Bilateral internal  jugular veins evaluated with color Doppler and Doppler waveform  ultrasound through maneuvers.    FINDINGS:  RIGHT:       REST:            INTERNAL JUGULAR VEIN: 54 cm/s, phasic, fully compressible            INNOMINATE VEIN: 25 cm/s, phasic            SUBCLAVIAN VEIN, medial: 42 cm/s, phasic            SUBCLAVIAN VEIN, mid: 31 cm/s, phasic, fully compressible            SUBCLAVIAN VEIN, lateral: 73 cm/s, phasic, fully  compressible            AXILLARY VEIN: 42 cm/s, phasic, fully compressible         MID SUBCLAVIAN VEIN, sitting erect:            0 degrees: 91 cm/s, phasic            90 degrees: 166 cm/s, phasic            135 degrees: 236 cm/s, phasic            180 degrees: 209 cm/s, phasic         INTERNAL JUGULAR VEIN, sitting erect:            Neutral: 145 cm/s, phasic            Right: 168 cm/s, phasic            Left: 166 cm/s, phasic            Extension: 154 cm/s, phasic            Flexion: 126 cm/s, phasic         PPGs:            Baseline: Normal            Arms 90: Normal            Arms 180: Severely dampened, flattened              : Normal             head right: Normal             head left: Normal            Onset: Normal    LEFT:       REST:            INTERNAL JUGULAR VEIN: 99 cm/s, phasic, fully compressible            INNOMINATE VEIN: 32 cm/s, phasic            SUBCLAVIAN VEIN, medial: 79 cm/s, phasic            SUBCLAVIAN VEIN, mid: 56 cm/s, phasic, fully compressible            SUBCLAVIAN VEIN, lateral: 36 cm/s, phasic, fully  compressible            AXILLARY VEIN: 53 cm/s, phasic, fully compressible         MID  SUBCLAVIAN VEIN, sitting erect:            0 degrees: 28 cm/s, phasic            90 degrees: 124 cm/s, phasic,             135 degrees: 137 cm/s, phasic            180 degrees: 228 cm/s, phasic         INTERNAL JUGULAR VEIN, sitting erect:            Neutral: 97 cm/s, phasic            Right: 190 cm/s, phasic            Left: 75 cm/s, phasic            Extension: 92 cm/s, phasic            Flexion: 81 cm/s, phasic        PPGs:            Baseline: Normal            Arms 90: Normal            Arms 180: Severely dampened, flattened            : Normal             head right: Normal             head left: Normal            Onset: Normal  Impression: IMPRESSION: Thoracic outlet/inlet physiology suggested. Correlate for  symptoms.    1. RIGHT:       A. No subclavian venous stenosis suggested at rest.       B. increased velocities and turbulent flow in the subclavian vein  with maneuvers without cessation of flow.       C. Patent right internal jugular vein with increased velocities  during various maneuvers, without cessation of flow.       D. Loss of PPG waveform/severe dampening at 180 degrees arm  abduction, nonspecific. Remainder of maneuvers do not dampen PPG  waveforms.    2. LEFT:       A. No subclavian venous stenosis suggested at rest.       B. increased velocities and turbulent flow in the subclavian vein  with maneuvers without cessation of flow.       C. Patent left internal jugular vein with increased velocities  during various maneuvers, without cessation of flow.       D. Loss of PPG waveform/severe dampening at 180 degrees arm  abduction, nonspecific. Remainder of maneuvers do not dampen PPG  waveforms.  .    I have personally reviewed the examination and initial interpretation  and I agree with the findings.    ELSA TAMAYO MD         SYSTEM ID:  [id number]  CTV Head w Contrast  Narrative: CTV HEAD NECK WITH CONTRAST 5/12/2022 9:22 AM    History:  71F with head extension  induced vertigo with downbeat  nystagmus. Question cerebellar degeneration, venous stenosis, styloid  length,.; Compression of vein; Nystagmus; TOS (thoracic outlet  syndrome)  ICD-10: Compression of vein; Nystagmus; TOS (thoracic outlet syndrome)     Comparison: Brain MRI 9/15/2021, 5/1/2008      Technique: Helically acquired thin section axial CT images were  obtained with 1 mm collimation through the brain after intravenous  bolus injection of iodinated contrast medium with an approximately  20-25 second delay between administration of contrast and scanning.  Image data were sent to the [employer] 3D workstation and postprocessed by  the radiologist using maximum intensity pixel (MIP), multiplanar and  volume rendered 3D reconstruction programs.     Contrast: Isovue 370 75cc    Findings: Diminutive size of the left transverse, sigmoid, and  internal jugular vein relative to the right. The left internal jugular  vein then is markedly narrowed between the styloid process and  transverse process of C1 which also persists on the extension images.  No intraluminal filling defects visualized intracranially or  extracranially. Bilateral subclavian and axillary veins are widely  patent without evidence for stenosis with the arms down.    There is no mass effect, midline shift, or  intracranial hemorrhage.   The ventricles are proportionate to the cerebral sulci. The gray-white  matter differentiation of the cerebral hemispheres is preserved.  Postcontrast images demonstrate no abnormal intracranial parenchymal  or meningeal enhancement. Moderate diffuse cerebral atrophy. Unchanged  asymmetric volume loss in the right parasagittal occipital lobe.     The orbits, visualized portions of paranasal sinuses, and mastoid air  cells are relatively clear.     Scattered multilevel cervical spondylosis without significant spinal  canal or neuroforaminal narrowing. No suspicious sclerotic or lucent  osseous lesion.    Cervical soft  tissues are unremarkable. Thyroid is unremarkable.    Partially visualized left chest wall ICD/Pacemaker.  Impression: Impression:    1. 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 Significance is uncertain given  the small size of this vein inferiorly. No intraluminal thrombus or  significant extraluminal stenosis in either the intracranial or  extracranial veins.  2. No CT evidence for thoracic outlet syndrome.  3. Moderate diffuse cerebral volume loss, likely sequelae of chronic  small vessel ischemic disease.    I have personally reviewed the examination and initial interpretation  and I agree with the findings.    ROCÍO TORRES MD         SYSTEM ID:  [id number]    38-minutes were spent in evaluation, examination, and counseling as well as documentation on the date of service. After a review of the patient s situation, this visit was changed from an in-person visit to a video visit to reduce the risk of COVID-19 exposure.        Sincerely,    Elva PEDROZA Cha, MD

## 2023-01-20 NOTE — PROGRESS NOTES
"The patient is being evaluated via a billable video visit.    How would you like to obtain your AVS? MyChart  If the video visit is dropped, the invitation should be resent by: [phone]  [email or web address]      Video-Visit Details  Type of service:  Video Visit  Video Start Time:2:01 PM  Video End Time:2:29 PM  Originating Location (pt. Location): Home  Distant Location (provider location):  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 On exam she has downbeat nystagmus that can be elicited with head extension in all positions (right, center, left) which is associated with her vertigo. She has severely impaired smooth pursuit but no gaze-evoked nystagmus. This pattern is not typical of BPPV but is concerning for a central process, like a cerebellar degeneration or a lesion in the posterior fossa.      Because of her pace-maker, we started with a CT venogram so that we can see the vasculature at the skull base, which can be compressed with head extension. There is also a mild left sided dysmetria which also points to a cerebellar lesion. There is a flavor of syndromic-ness to her picture given the severe hearing loss, white matter disease, bradycardia, and now the central eye movement abnormality and ataxia.       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 Severe narrowing of the left internal jugular vein between the styloid process of the lateral edge of C1 is present on both neutral and extension positions.\"     On my review there was also loss of volume at the cerebellar vermis out of proportion to the rest of the " "brain and the rest of the cerebellum. The volume loss was so severe that it almost looked like a cyst. There is also loss of volume of the right occipital lobe and bilateral parietal lobes.      Interim History 8-26-22:  Trial of 4-AP, titrated to 10mg BID. She has just started on the goal dose. She has been on this for a little more than a month. When she gets up she feels lightheadedness. There is no vertigo. There is some sense of disorientation. There is no sense of oscillopsia. The trigger can be noise, standing up from sitting, head extension. If she is not moving her head, she feels okay. Her symptoms are only triggered by head movement. All direction of head movement are equally as bad. They are triggered only when she is standing up. She is fine moving her head when sitting or lying down. There is no headache or head pressure. No ear pain or ear pressure. There is sometimes ringing in the ears. There is no neck pain or tightness. There is no chest pain. No pelvic or abdominal pressure. There is no family history of similar problems.      She is not having any falls. She feels like she is \"running\" when she walks because her feet go faster than she wants them to. She does not endorse freezing of gait. She tends to walk in a zig-zag way. She walks like she is drunk.      She has mild dysarthria. Patient denies any visual problems.   Paraneoplastic panel, Anti-KRYSTA, Vitamin E were all normal 6-13-22     Impression: Potentially a syndrome of cerebellar, biparietal atrophy, white matter disease, hearing loss, bradycardia, with additional component of left internal jugular compression at the skull base. Before considering more tests for ataxia, I'd like to obtain  from my colleague Jc Araya, an ataxia specialist, so that we can proceed more efficiently. In the meantime, she should continue with the 4-AP as she has just reached the goal dose.      Plan:   1. Continue with the 4-AP 10mg BID  2. Referral " to Jc Araya, [facility]ia [facility] regarding evaluation for genetic or other neurodegeneration disorders of ataxia given caudal cerebellum, parietal lobe, and occipital lobe atrophy.      INTERIM HISTORY 1/20/2023:  10-7-22: Appt Dr. Araya.  Did not detect ataxia or downbeat nystagmus (notably, patient was on 4-AP at the time).  Added TPO antibodies which were elevated at 312 but patient has known hypothyroidism and is on levothyroxine. Transglutaminase, TSH and thyroglobulin antibodies were negative.     10-7-22: GENETIC COUNSELING  We discussed the genetic and environmental basis of neurologic disease. I explained that her later age of onset and lack of clear family history would argue against a single gene cause for her symptoms.     She has been stable overall. She was a little worse in December, listening to music and singing in Buddhism and felt more vertigo. She had to go home and lie down. She is better in January. She notices that being in situations where the body is shaken can make her worse.     She is taking 4-AP 10mg twice a day. She feels like disorientation on this medication. She does not have an improvement in balance. She fell twice on the snow. She does not fall in regular places indoors. She can hit the wall when she walks. She feels that her speech is fine.  There is no tremor.     There is no headache or head pressure. There are no muscle pains.     Exam: very mild downbeat nystagmus only on lateral gaze. Speech is normal. Content normal.  Plan:  1. Stay on 4-AP for 1 year. Consider trial of Ampyra if not noticing improvement  2. Autonomic function testing 2-3-23    DATA:  I personally reviewed the following data.    Last brain imaging:  US Up Ex [facility] Thor Outlet Syn Bilat  Narrative: THORACIC INLET/OUTLET DUPLEX ULTRASOUND 5/12/2022 10:56 AM    CLINICAL HISTORY: 71F with forehead pressure and vertigo with head  extension.; Compression of vein; TOS (thoracic outlet  syndrome).    COMPARISONS: None available.    REFERRING PROVIDER: GISELLE COKER CHA    TECHNIQUE: Bilateral innominate, subclavian, and axillary veins were  evaluated grayscale, color Doppler, Doppler waveform ultrasound.  Bilateral subclavian veins were evaluated with color Doppler and  Doppler waveform imaging through abduction maneuvers.    Bilateral index finger PPG's obtained at rest and with provocative  positions.    Bilateral internal jugular veins evaluated at rest with grayscale,  color Doppler, and Doppler waveform ultrasound. Bilateral internal  jugular veins evaluated with color Doppler and Doppler waveform  ultrasound through maneuvers.    FINDINGS:  RIGHT:       REST:            INTERNAL JUGULAR VEIN: 54 cm/s, phasic, fully compressible            INNOMINATE VEIN: 25 cm/s, phasic            SUBCLAVIAN VEIN, medial: 42 cm/s, phasic            SUBCLAVIAN VEIN, mid: 31 cm/s, phasic, fully compressible            SUBCLAVIAN VEIN, lateral: 73 cm/s, phasic, fully  compressible            AXILLARY VEIN: 42 cm/s, phasic, fully compressible         MID SUBCLAVIAN VEIN, sitting erect:            0 degrees: 91 cm/s, phasic            90 degrees: 166 cm/s, phasic            135 degrees: 236 cm/s, phasic            180 degrees: 209 cm/s, phasic         INTERNAL JUGULAR VEIN, sitting erect:            Neutral: 145 cm/s, phasic            Right: 168 cm/s, phasic            Left: 166 cm/s, phasic            Extension: 154 cm/s, phasic            Flexion: 126 cm/s, phasic         PPGs:            Baseline: Normal            Arms 90: Normal            Arms 180: Severely dampened, flattened              : Normal             head right: Normal             head left: Normal            Onset: Normal    LEFT:       REST:            INTERNAL JUGULAR VEIN: 99 cm/s, phasic, fully compressible            INNOMINATE VEIN: 32 cm/s, phasic            SUBCLAVIAN VEIN, medial: 79 cm/s, phasic             SUBCLAVIAN VEIN, mid: 56 cm/s, phasic, fully compressible            SUBCLAVIAN VEIN, lateral: 36 cm/s, phasic, fully  compressible            AXILLARY VEIN: 53 cm/s, phasic, fully compressible         MID SUBCLAVIAN VEIN, sitting erect:            0 degrees: 28 cm/s, phasic            90 degrees: 124 cm/s, phasic,             135 degrees: 137 cm/s, phasic            180 degrees: 228 cm/s, phasic         INTERNAL JUGULAR VEIN, sitting erect:            Neutral: 97 cm/s, phasic            Right: 190 cm/s, phasic            Left: 75 cm/s, phasic            Extension: 92 cm/s, phasic            Flexion: 81 cm/s, phasic        PPGs:            Baseline: Normal            Arms 90: Normal            Arms 180: Severely dampened, flattened            : Normal             head right: Normal             head left: Normal            Onset: Normal  Impression: IMPRESSION: Thoracic outlet/inlet physiology suggested. Correlate for  symptoms.    1. RIGHT:       A. No subclavian venous stenosis suggested at rest.       B. increased velocities and turbulent flow in the subclavian vein  with maneuvers without cessation of flow.       C. Patent right internal jugular vein with increased velocities  during various maneuvers, without cessation of flow.       D. Loss of PPG waveform/severe dampening at 180 degrees arm  abduction, nonspecific. Remainder of maneuvers do not dampen PPG  waveforms.    2. LEFT:       A. No subclavian venous stenosis suggested at rest.       B. increased velocities and turbulent flow in the subclavian vein  with maneuvers without cessation of flow.       C. Patent left internal jugular vein with increased velocities  during various maneuvers, without cessation of flow.       D. Loss of PPG waveform/severe dampening at 180 degrees arm  abduction, nonspecific. Remainder of maneuvers do not dampen PPG  waveforms.  .    I have personally reviewed the examination and initial  interpretation  and I agree with the findings.    ELSA TAMAYO MD         SYSTEM ID:  [id number]  CTV Head w Contrast  Narrative: CTV HEAD NECK WITH CONTRAST 5/12/2022 9:22 AM    History:  71F with head extension induced vertigo with downbeat  nystagmus. Question cerebellar degeneration, venous stenosis, styloid  length,.; Compression of vein; Nystagmus; TOS (thoracic outlet  syndrome)  ICD-10: Compression of vein; Nystagmus; TOS (thoracic outlet syndrome)     Comparison: Brain MRI 9/15/2021, 5/1/2008      Technique: Helically acquired thin section axial CT images were  obtained with 1 mm collimation through the brain after intravenous  bolus injection of iodinated contrast medium with an approximately  20-25 second delay between administration of contrast and scanning.  Image data were sent to the [employer] 3D workstation and postprocessed by  the radiologist using maximum intensity pixel (MIP), multiplanar and  volume rendered 3D reconstruction programs.     Contrast: Isovue 370 75cc    Findings: Diminutive size of the left transverse, sigmoid, and  internal jugular vein relative to the right. The left internal jugular  vein then is markedly narrowed between the styloid process and  transverse process of C1 which also persists on the extension images.  No intraluminal filling defects visualized intracranially or  extracranially. Bilateral subclavian and axillary veins are widely  patent without evidence for stenosis with the arms down.    There is no mass effect, midline shift, or  intracranial hemorrhage.   The ventricles are proportionate to the cerebral sulci. The gray-white  matter differentiation of the cerebral hemispheres is preserved.  Postcontrast images demonstrate no abnormal intracranial parenchymal  or meningeal enhancement. Moderate diffuse cerebral atrophy. Unchanged  asymmetric volume loss in the right parasagittal occipital lobe.     The orbits, visualized portions of paranasal sinuses, and  mastoid air  cells are relatively clear.     Scattered multilevel cervical spondylosis without significant spinal  canal or neuroforaminal narrowing. No suspicious sclerotic or lucent  osseous lesion.    Cervical soft tissues are unremarkable. Thyroid is unremarkable.    Partially visualized left chest wall ICD/Pacemaker.  Impression: Impression:    1. 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 Significance is uncertain given  the small size of this vein inferiorly. No intraluminal thrombus or  significant extraluminal stenosis in either the intracranial or  extracranial veins.  2. No CT evidence for thoracic outlet syndrome.  3. Moderate diffuse cerebral volume loss, likely sequelae of chronic  small vessel ischemic disease.    I have personally reviewed the examination and initial interpretation  and I agree with the findings.    ROCÍO TORRES MD         SYSTEM ID:  [id number]    38-minutes were spent in evaluation, examination, and counseling as well as documentation on the date of service. After a review of the patient s situation, this visit was changed from an in-person visit to a video visit to reduce the risk of COVID-19 exposure.

## 2023-01-20 NOTE — LETTER
"1/20/2023       RE: Pat Barclay  [address] [place] [address] [address]  [place] [address]     Dear Colleague,    Thank you for referring your patient, Pat Barclay, to the [facility] [place] at [facility]. Please see a copy of my visit note below.    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 On exam she has downbeat nystagmus that can be elicited with head extension in all positions (right, center, left) which is associated with her vertigo. She has severely impaired smooth pursuit but no gaze-evoked nystagmus. This pattern is not typical of BPPV but is concerning for a central process, like a cerebellar degeneration or a lesion in the posterior fossa.      Because of her pace-maker, we started with a CT venogram so that we can see the vasculature at the skull base, which can be compressed with head extension. There is also a mild left sided dysmetria which also points to a cerebellar lesion. There is a flavor of syndromic-ness to her picture given the severe hearing loss, white matter disease, bradycardia, and now the central eye movement abnormality and ataxia.       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 The volume loss was so severe that it almost looked like a cyst. There is also loss of volume of the right occipital lobe and bilateral parietal " "lobes.      Interim History 8-26-22:  Trial of 4-AP, titrated to 10mg BID. She has just started on the goal dose. She has been on this for a little more than a month. When she gets up she feels lightheadedness. There is no vertigo. There is some sense of disorientation. There is no sense of oscillopsia. The trigger can be noise, standing up from sitting, head extension. If she is not moving her head, she feels okay. Her symptoms are only triggered by head movement. All direction of head movement are equally as bad. They are triggered only when she is standing up. She is fine moving her head when sitting or lying down. There is no headache or head pressure. No ear pain or ear pressure. There is sometimes ringing in the ears. There is no neck pain or tightness. There is no chest pain. No pelvic or abdominal pressure. There is no family history of similar problems.      She is not having any falls. She feels like she is \"running\" when she walks because her feet go faster than she wants them to. She does not endorse freezing of gait. She tends to walk in a zig-zag way. She walks like she is drunk.      She has mild dysarthria. Patient denies any visual problems.   Paraneoplastic panel, Anti-KRYSTA, Vitamin E were all normal 6-13-22     Impression: Potentially a syndrome of cerebellar, biparietal atrophy, white matter disease, hearing loss, bradycardia, with additional component of left internal jugular compression at the skull base. Before considering more tests for ataxia, I'd like to obtain  from my colleague Jc Araya, an ataxia specialist, so that we can proceed more efficiently. In the meantime, she should continue with the 4-AP as she has just reached the goal dose.      Plan:   1. Continue with the 4-AP 10mg BID  2. Referral to Jc Araya, Ataxia Clinic regarding evaluation for genetic or other neurodegeneration disorders of ataxia given caudal cerebellum, parietal lobe, and occipital lobe atrophy. "      INTERIM HISTORY 1/20/2023:  10-7-22: Appt Dr. Araya.  Did not detect ataxia or downbeat nystagmus (notably, patient was on 4-AP at the time).  Added TPO antibodies which were elevated at 312 but patient has known hypothyroidism and is on levothyroxine. Transglutaminase, TSH and thyroglobulin antibodies were negative.     10-7-22: GENETIC COUNSELING  We discussed the genetic and environmental basis of neurologic disease. I explained that her later age of onset and lack of clear family history would argue against a single gene cause for her symptoms.     She has been stable overall. She was a little worse in December, listening to music and singing in [employer] and felt more vertigo. She had to go home and lie down. She is better in January. She notices that being in situations where the body is shaken can make her worse.     She is taking 4-AP 10mg twice a day. She feels like disorientation on this medication. She does not have an improvement in balance. She fell twice on the snow. She does not fall in regular places indoors. She can hit the wall when she walks. She feels that her speech is fine.  There is no tremor.     There is no headache or head pressure. There are no muscle pains.     Exam: very mild downbeat nystagmus only on lateral gaze. Speech is normal. Content normal.  Plan:  1. Stay on 4-AP for 1 year. Consider trial of Ampyra if not noticing improvement  2. Autonomic function testing 2-3-23    DATA:  I personally reviewed the following data.    Last brain imaging:  US Up Ex [facility] Thor Outlet Syn Bilat  Narrative: THORACIC INLET/OUTLET DUPLEX ULTRASOUND 5/12/2022 10:56 AM    CLINICAL HISTORY: 71F with forehead pressure and vertigo with head  extension.; Compression of vein; TOS (thoracic outlet syndrome).    COMPARISONS: None available.    REFERRING PROVIDER: GISELLE COKER CHA    TECHNIQUE: Bilateral innominate, subclavian, and axillary veins were  evaluated grayscale, color Doppler, Doppler waveform  ultrasound.  Bilateral subclavian veins were evaluated with color Doppler and  Doppler waveform imaging through abduction maneuvers.    Bilateral index finger PPG's obtained at rest and with provocative  positions.    Bilateral internal jugular veins evaluated at rest with grayscale,  color Doppler, and Doppler waveform ultrasound. Bilateral internal  jugular veins evaluated with color Doppler and Doppler waveform  ultrasound through maneuvers.    FINDINGS:  RIGHT:       REST:            INTERNAL JUGULAR VEIN: 54 cm/s, phasic, fully compressible            INNOMINATE VEIN: 25 cm/s, phasic            SUBCLAVIAN VEIN, medial: 42 cm/s, phasic            SUBCLAVIAN VEIN, mid: 31 cm/s, phasic, fully compressible            SUBCLAVIAN VEIN, lateral: 73 cm/s, phasic, fully  compressible            AXILLARY VEIN: 42 cm/s, phasic, fully compressible         MID SUBCLAVIAN VEIN, sitting erect:            0 degrees: 91 cm/s, phasic            90 degrees: 166 cm/s, phasic            135 degrees: 236 cm/s, phasic            180 degrees: 209 cm/s, phasic         INTERNAL JUGULAR VEIN, sitting erect:            Neutral: 145 cm/s, phasic            Right: 168 cm/s, phasic            Left: 166 cm/s, phasic            Extension: 154 cm/s, phasic            Flexion: 126 cm/s, phasic         PPGs:            Baseline: Normal            Arms 90: Normal            Arms 180: Severely dampened, flattened              : Normal             head right: Normal             head left: Normal            Onset: Normal    LEFT:       REST:            INTERNAL JUGULAR VEIN: 99 cm/s, phasic, fully compressible            INNOMINATE VEIN: 32 cm/s, phasic            SUBCLAVIAN VEIN, medial: 79 cm/s, phasic            SUBCLAVIAN VEIN, mid: 56 cm/s, phasic, fully compressible            SUBCLAVIAN VEIN, lateral: 36 cm/s, phasic, fully  compressible            AXILLARY VEIN: 53 cm/s, phasic, fully compressible         MID  SUBCLAVIAN VEIN, sitting erect:            0 degrees: 28 cm/s, phasic            90 degrees: 124 cm/s, phasic,             135 degrees: 137 cm/s, phasic            180 degrees: 228 cm/s, phasic         INTERNAL JUGULAR VEIN, sitting erect:            Neutral: 97 cm/s, phasic            Right: 190 cm/s, phasic            Left: 75 cm/s, phasic            Extension: 92 cm/s, phasic            Flexion: 81 cm/s, phasic        PPGs:            Baseline: Normal            Arms 90: Normal            Arms 180: Severely dampened, flattened            : Normal             head right: Normal             head left: Normal            Onset: Normal  Impression: IMPRESSION: Thoracic outlet/inlet physiology suggested. Correlate for  symptoms.    1. RIGHT:       A. No subclavian venous stenosis suggested at rest.       B. increased velocities and turbulent flow in the subclavian vein  with maneuvers without cessation of flow.       C. Patent right internal jugular vein with increased velocities  during various maneuvers, without cessation of flow.       D. Loss of PPG waveform/severe dampening at 180 degrees arm  abduction, nonspecific. Remainder of maneuvers do not dampen PPG  waveforms.    2. LEFT:       A. No subclavian venous stenosis suggested at rest.       B. increased velocities and turbulent flow in the subclavian vein  with maneuvers without cessation of flow.       C. Patent left internal jugular vein with increased velocities  during various maneuvers, without cessation of flow.       D. Loss of PPG waveform/severe dampening at 180 degrees arm  abduction, nonspecific. Remainder of maneuvers do not dampen PPG  waveforms.  .    I have personally reviewed the examination and initial interpretation  and I agree with the findings.    ELSA TAMAYO MD         SYSTEM ID:  [id number]  CTV Head w Contrast  Narrative: CTV HEAD NECK WITH CONTRAST 5/12/2022 9:22 AM    History:  71F with head extension  induced vertigo with downbeat  nystagmus. Question cerebellar degeneration, venous stenosis, styloid  length,.; Compression of vein; Nystagmus; TOS (thoracic outlet  syndrome)  ICD-10: Compression of vein; Nystagmus; TOS (thoracic outlet syndrome)     Comparison: Brain MRI 9/15/2021, 5/1/2008      Technique: Helically acquired thin section axial CT images were  obtained with 1 mm collimation through the brain after intravenous  bolus injection of iodinated contrast medium with an approximately  20-25 second delay between administration of contrast and scanning.  Image data were sent to the [employer] 3D workstation and postprocessed by  the radiologist using maximum intensity pixel (MIP), multiplanar and  volume rendered 3D reconstruction programs.     Contrast: Isovue 370 75cc    Findings: Diminutive size of the left transverse, sigmoid, and  internal jugular vein relative to the right. The left internal jugular  vein then is markedly narrowed between the styloid process and  transverse process of C1 which also persists on the extension images.  No intraluminal filling defects visualized intracranially or  extracranially. Bilateral subclavian and axillary veins are widely  patent without evidence for stenosis with the arms down.    There is no mass effect, midline shift, or  intracranial hemorrhage.   The ventricles are proportionate to the cerebral sulci. The gray-white  matter differentiation of the cerebral hemispheres is preserved.  Postcontrast images demonstrate no abnormal intracranial parenchymal  or meningeal enhancement. Moderate diffuse cerebral atrophy. Unchanged  asymmetric volume loss in the right parasagittal occipital lobe.     The orbits, visualized portions of paranasal sinuses, and mastoid air  cells are relatively clear.     Scattered multilevel cervical spondylosis without significant spinal  canal or neuroforaminal narrowing. No suspicious sclerotic or lucent  osseous lesion.    Cervical soft  tissues are unremarkable. Thyroid is unremarkable.    Partially visualized left chest wall ICD/Pacemaker.  Impression: Impression:    1. 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 Significance is uncertain given  the small size of this vein inferiorly. No intraluminal thrombus or  significant extraluminal stenosis in either the intracranial or  extracranial veins.  2. No CT evidence for thoracic outlet syndrome.  3. Moderate diffuse cerebral volume loss, likely sequelae of chronic  small vessel ischemic disease.    I have personally reviewed the examination and initial interpretation  and I agree with the findings.    ROCÍO TORRES MD         SYSTEM ID:  [id number]    38-minutes were spent in evaluation, examination, and counseling as well as documentation on the date of service. After a review of the patient s situation, this visit was changed from an in-person visit to a video visit to reduce the risk of COVID-19 exposure.        Sincerely,    Elva PEDROZA Cha, MD

## 2023-01-20 NOTE — Clinical Note
January 20, 2023       TO: Pat Barclay  [address] [place] [address] [address]  [place] [address]       DearMs.Deny,    We are writing to inform you of your test results.    {p results letter list:[id number]}    No results found from the In Basket message.    ***

## 2023-04-26 ENCOUNTER — TELEPHONE (OUTPATIENT)
Dept: NEUROLOGY | Facility: CLINIC | Age: 73
End: 2023-04-26
Payer: MEDICARE

## 2023-04-26 NOTE — TELEPHONE ENCOUNTER
[facility] Call Center    Phone Message    May a detailed message be left on voicemail: yes     Reason for Call: Medication Refill Request    Has the patient contacted the pharmacy for the refill? Yes   Name of medication being requested: Aminopyrivine  Provider who prescribed the medication: Amauri Membreno  Pharmacy: [facility]  Date medication is needed: ASAP         Action Taken: Message routed to:  [facility]    Travel Screening: Not Applicable

## 2023-04-26 NOTE — TELEPHONE ENCOUNTER
Called pharmacy spoke to Jordana, had her spell the medication they are requesting.  She stated Aminopyridine and Dr. Segundo last prescribed it May 31, 2022.  Informed her I will give message to provider.

## 2023-05-20 ENCOUNTER — HEALTH MAINTENANCE LETTER (OUTPATIENT)
Age: 73
End: 2023-05-20

## 2023-07-11 ENCOUNTER — TELEPHONE (OUTPATIENT)
Dept: NEUROLOGY | Facility: CLINIC | Age: 73
End: 2023-07-11
Payer: MEDICARE

## 2023-07-11 NOTE — TELEPHONE ENCOUNTER
"Pt reports she has had dizziness worsening for past 2 days. Yesterday dizziness was constant, today intermittent dizziness. Pt stated she is spinning, room is not spinning. She stated no infection, no fever, no nausea, no change in vision, feels weak, No HA. She thinks her compounded medication \"Aminopyridime\" not working well enough.  Pt stated she is taking 10mg BID.  She wants to know if she can increase the dose or what does Dr. Segundo recommend?   "

## 2023-07-11 NOTE — TELEPHONE ENCOUNTER
[facility] Call Center    Phone Message    May a detailed message be left on voicemail: yes     Reason for Call: Other: Patient is retruning a voicemail left by Yessi Moreau in regards to symptoms recently reported.      Please follow up with patient to further advise at # [phone]    Action Taken: Message routed to:  [facility] neurology    Travel Screening:

## 2023-07-11 NOTE — TELEPHONE ENCOUNTER
[facility] Call Center    Phone Message    May a detailed message be left on voicemail: yes     Reason for Call: Symptoms or Concerns     If patient has red-flag symptoms, warm transfer to triage line    Current symptom or concern: Having dizzy spells that are getting worse. Her medications seem to have completely stopped working the last 2 days     Symptoms have been present for:  2 day(s)    Has patient previously been seen for this? Yes    By : Dr. Segundo    Date: 01/20/2023    Are there any new or worsening symptoms? Yes: Pt has been having worsening dizzy spells. States that it feels like her medications haven't worked at all the last 2 days       Action Taken: Message routed to:  [facility] Neuro    Travel Screening: Not Applicable

## 2023-07-12 NOTE — TELEPHONE ENCOUNTER
Called pt and informed her I had sent her a my chart message this morning and read her the my chart message.  Pt stated she did not know that she had an appt on July 21.  Informed her it is a virtual appt that she made in February.  Again, per Dr. Segundo if this is new  dizziness, then she should be seen in an ER at a hospital as we can not  evaluate it over MyChart, otherwise if worsening baseline dizziness, follow up as scheduled and she verbally understood.

## 2023-07-21 ENCOUNTER — VIRTUAL VISIT (OUTPATIENT)
Dept: NEUROLOGY | Facility: CLINIC | Age: 73
End: 2023-07-21
Payer: MEDICARE

## 2023-07-21 DIAGNOSIS — R27.0 ATAXIA: Primary | ICD-10-CM

## 2023-07-21 DIAGNOSIS — G31.9 CEREBELLAR ATROPHY (H): ICD-10-CM

## 2023-07-21 DIAGNOSIS — I95.1 ORTHOSTATIC HYPOTENSION: ICD-10-CM

## 2023-07-21 DIAGNOSIS — H55.09 DOWNBEAT NYSTAGMUS: ICD-10-CM

## 2023-07-21 PROCEDURE — 99215 OFFICE O/P EST HI 40 MIN: CPT | Mod: 95 | Performed by: PSYCHIATRY & NEUROLOGY

## 2023-07-21 NOTE — PROGRESS NOTES
"The patient is being evaluated via a billable video visit.    How would you like to obtain your AVS? MyChart  If the video visit is dropped, the invitation should be resent by: Send to e-mail at: [email or web address]  Will anyone else be joining your video visit? No      Video-Visit Details  Type of service:  Video Visit  Video Start Time:3:34 PM  Video End Time:4:23 PM  Originating Location (pt. 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 On exam she has downbeat nystagmus that can be elicited with head extension in all positions (right, center, left) which is associated with her vertigo. She has severely impaired smooth pursuit but no gaze-evoked nystagmus. This pattern is not typical of BPPV but is concerning for a central process, like a cerebellar degeneration or a lesion in the posterior fossa.      Because of her pace-maker, we started with a CT venogram so that we can see the vasculature at the skull base, which can be compressed with head extension. There is also a mild left sided dysmetria which also points to a cerebellar lesion. There is a flavor of syndromic-ness to her picture given the severe hearing loss, white matter disease, bradycardia, and now the central eye movement abnormality and ataxia.       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 Severe narrowing of the left internal jugular vein between the styloid process of the lateral edge of C1 is present on both neutral and extension positions.\"     On my review there was also loss of volume at the " "cerebellar vermis out of proportion to the rest of the brain and the rest of the cerebellum. The volume loss was so severe that it almost looked like a cyst. There is also loss of volume of the right occipital lobe and bilateral parietal lobes.      Interim History 8-26-22:  Trial of 4-AP, titrated to 10mg BID. She has just started on the goal dose. She has been on this for a little more than a month. When she gets up she feels lightheadedness. There is no vertigo. There is some sense of disorientation. There is no sense of oscillopsia. The trigger can be noise, standing up from sitting, head extension. If she is not moving her head, she feels okay. Her symptoms are only triggered by head movement. All direction of head movement are equally as bad. They are triggered only when she is standing up. She is fine moving her head when sitting or lying down. There is no headache or head pressure. No ear pain or ear pressure. There is sometimes ringing in the ears. There is no neck pain or tightness. There is no chest pain. No pelvic or abdominal pressure. There is no family history of similar problems.      She is not having any falls. She feels like she is \"running\" when she walks because her feet go faster than she wants them to. She does not endorse freezing of gait. She tends to walk in a zig-zag way. She walks like she is drunk.      She has mild dysarthria. Patient denies any visual problems.   Paraneoplastic panel, Anti-KRYSTA, Vitamin E were all normal 6-13-22     Impression: Potentially a syndrome of cerebellar, biparietal atrophy, white matter disease, hearing loss, bradycardia, with additional component of left internal jugular compression at the skull base. Before considering more tests for ataxia, I'd like to obtain  from my colleague Jc Araya, an ataxia specialist, so that we can proceed more efficiently. In the meantime, she should continue with the 4-AP as she has just reached the goal " dose.      Plan:   1. Continue with the 4-AP 10mg BID  2. Referral to Jc Araya, [facility]ia [facility] regarding evaluation for genetic or other neurodegeneration disorders of ataxia given caudal cerebellum, parietal lobe, and occipital lobe atrophy.      INTERIM HISTORY 1/20/2023:  10-7-22: Appt Dr. Araya.  Did not detect ataxia or downbeat nystagmus (notably, patient was on 4-AP at the time).  Added TPO antibodies which were elevated at 312 but patient has known hypothyroidism and is on levothyroxine. Transglutaminase, TSH and thyroglobulin antibodies were negative.      10-7-22: GENETIC COUNSELING  We discussed the genetic and environmental basis of neurologic disease. I explained that her later age of onset and lack of clear family history would argue against a single gene cause for her symptoms.     She has been stable overall. She was a little worse in December, listening to music and singing in [employer] and felt more vertigo. She had to go home and lie down. She is better in January. She notices that being in situations where the body is shaken can make her worse.      She is taking 4-AP 10mg twice a day. She feels like disorientation on this medication. She does not have an improvement in balance. She fell twice on the snow. She does not fall in regular places indoors. She can hit the wall when she walks. She feels that her speech is fine.  There is no tremor.      There is no headache or head pressure. There are no muscle pains.      Exam: very mild downbeat nystagmus only on lateral gaze. Speech is normal. Content normal.  Plan:  1. Stay on 4-AP for 1 year. Consider trial of Ampyra if not noticing improvement  2. Autonomic function testing 2-3-23    Interim History 7/21/2023:  2-13-23 Autonomic testing [facility] [place]. 50mmHg orthostatic drop. Concern for central autonomic disorder like MSA. Note that patient has a pacemaker. She does not remember her dizziness being triggered during the test.      She has been having  "dizzy spells that have worsened over the last 8 days. She thinks that they are her previous spell, just worse. She feels that the worsening was suddenly worse with no particularly worse.     There is sense that the world is moving around her, all directions, very frequently triggered but not present 100% of the time.  It is worsened by body movement (getting up from bed, getting up from the couch). It gets better lying down, taking a nap. It is completely better lying down and staying still. If she turns her head lying down but turning her head, she she feels light headed and feeling like her head is, \"swimming.\" Her head feels like there is pressure, worse when she stands up.     She is taking the 4-AP, 10mg BID. Getting it from [facility]. She didn't start a new bottle of pills when the symptoms worsened.     Exam:   Right now, sitting still, without moving the head, there's no vertigo. This could be triggered by standing up.     Turning her head to the right doesn't trigger, but triggers with returned to baseline.  Turning the head to the left doesn't trigger, but triggers with returned to baseline.  There is no vertigo with head extension, but triggers with returned to baseline.   There is no vertigo with head flexion, but triggers with returned to baseline.    The feeling is a swirling. There is no presyncope. It took less than a minute for the feeling to go away.     She stood up and felt the same as sitting. Whether sitting for 5-minutes or an hour would not make a difference.   She does feel that her balance is poor. She has been going to PT for balance training. She has not been falling.   There is no chronic pain in the head, neck, or arms. She does have headache twice a week experienced as a frontal pain, slight, well managed with ibuprofen or acetaminophen.     Plan:   1. Patient to measure blood pressure lying, sitting, standing position and correlate with intensity of vertigo  2. If there is some " room on the blood, trial of midodrine to raise the blood pressure to treat orthostatic hypotension  3. Continue with the 4-AP at 10mg BID  4. Broached the possibility of a neurodegenerative process    DATA:  I personally reviewed the following data.  Autonomic testing [facility] [place]:  Conclusion  There is normal postganglionic sudomotor function, severe cardiovagal failure, and severe adrenergic failure.   Orthostatic hypotension is recorded.  HR responses may be obscured by pacemaker.   Normal postganglionic responses together with severe autonomic failure are suggestive of central autonomic causes such as synucleinopathy (MSA)    [facility] [employer] Thor Outlet Syn Bilat 5/12/2022      CLINICAL HISTORY: 71F with forehead pressure and vertigo with head  extension.; Compression of vein; TOS (thoracic outlet syndrome).     COMPARISONS: None available.    FINDINGS:  RIGHT:       REST:            INTERNAL JUGULAR VEIN: 54 cm/s, phasic, fully compressible            INNOMINATE VEIN: 25 cm/s, phasic            SUBCLAVIAN VEIN, medial: 42 cm/s, phasic            SUBCLAVIAN VEIN, mid: 31 cm/s, phasic, fully compressible            SUBCLAVIAN VEIN, lateral: 73 cm/s, phasic, fully  compressible            AXILLARY VEIN: 42 cm/s, phasic, fully compressible          MID SUBCLAVIAN VEIN, sitting erect:            0 degrees: 91 cm/s, phasic            90 degrees: 166 cm/s, phasic            135 degrees: 236 cm/s, phasic            180 degrees: 209 cm/s, phasic          INTERNAL JUGULAR VEIN, sitting erect:            Neutral: 145 cm/s, phasic            Right: 168 cm/s, phasic            Left: 166 cm/s, phasic            Extension: 154 cm/s, phasic            Flexion: 126 cm/s, phasic          PPGs:            Baseline: Normal            Arms 90: Normal            Arms 180: Severely dampened, flattened              : Normal             head right: Normal             head left: Normal            Onset:  Normal     LEFT:       REST:            INTERNAL JUGULAR VEIN: 99 cm/s, phasic, fully compressible            INNOMINATE VEIN: 32 cm/s, phasic            SUBCLAVIAN VEIN, medial: 79 cm/s, phasic            SUBCLAVIAN VEIN, mid: 56 cm/s, phasic, fully compressible            SUBCLAVIAN VEIN, lateral: 36 cm/s, phasic, fully  compressible            AXILLARY VEIN: 53 cm/s, phasic, fully compressible          MID SUBCLAVIAN VEIN, sitting erect:            0 degrees: 28 cm/s, phasic            90 degrees: 124 cm/s, phasic,             135 degrees: 137 cm/s, phasic            180 degrees: 228 cm/s, phasic          INTERNAL JUGULAR VEIN, sitting erect:            Neutral: 97 cm/s, phasic            Right: 190 cm/s, phasic            Left: 75 cm/s, phasic            Extension: 92 cm/s, phasic            Flexion: 81 cm/s, phasic         PPGs:            Baseline: Normal            Arms 90: Normal            Arms 180: Severely dampened, flattened            : Normal             head right: Normal             head left: Normal            Onset: Normal  Impression: IMPRESSION: Thoracic outlet/inlet physiology suggested. Correlate for  symptoms.     1. RIGHT:       A. No subclavian venous stenosis suggested at rest.       B. increased velocities and turbulent flow in the subclavian vein  with maneuvers without cessation of flow.       C. Patent right internal jugular vein with increased velocities  during various maneuvers, without cessation of flow.       D. Loss of PPG waveform/severe dampening at 180 degrees arm  abduction, nonspecific. Remainder of maneuvers do not dampen PPG  waveforms.     2. LEFT:       A. No subclavian venous stenosis suggested at rest.       B. increased velocities and turbulent flow in the subclavian vein  with maneuvers without cessation of flow.       C. Patent left internal jugular vein with increased velocities  during various maneuvers, without cessation of flow.       D.  Loss of PPG waveform/severe dampening at 180 degrees arm  abduction, nonspecific. Remainder of maneuvers do not dampen PPG  waveforms.  .     I have personally reviewed the examination and initial interpretation  and I agree with the findings.     ELSA TAMAYO MD         SYSTEM ID:  [id number]  CTV HEAD NECK WITH CONTRAST 5/12/2022      History:  71F with head extension induced vertigo with downbeat  nystagmus. Question cerebellar degeneration, venous stenosis, styloid  length,.; Compression of vein; Nystagmus; TOS (thoracic outlet  syndrome)  ICD-10: Compression of vein; Nystagmus; TOS (thoracic outlet syndrome)      Findings: Diminutive size of the left transverse, sigmoid, and  internal jugular vein relative to the right. The left internal jugular  vein then is markedly narrowed between the styloid process and  transverse process of C1 which also persists on the extension images.  No intraluminal filling defects visualized intracranially or  extracranially. Bilateral subclavian and axillary veins are widely  patent without evidence for stenosis with the arms down.     There is no mass effect, midline shift, or  intracranial hemorrhage.   The ventricles are proportionate to the cerebral sulci. The gray-white  matter differentiation of the cerebral hemispheres is preserved.  Postcontrast images demonstrate no abnormal intracranial parenchymal  or meningeal enhancement. Moderate diffuse cerebral atrophy. Unchanged  asymmetric volume loss in the right parasagittal occipital lobe.      The orbits, visualized portions of paranasal sinuses, and mastoid air  cells are relatively clear.      Scattered multilevel cervical spondylosis without significant spinal  canal or neuroforaminal narrowing. No suspicious sclerotic or lucent  osseous lesion.     Cervical soft tissues are unremarkable. Thyroid is unremarkable.     Partially visualized left chest wall ICD/Pacemaker.  Impression: Impression:    1. 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 Significance is uncertain given  the small size of this vein inferiorly. No intraluminal thrombus or  significant extraluminal stenosis in either the intracranial or  extracranial veins.  2. No CT evidence for thoracic outlet syndrome.  3. Moderate diffuse cerebral volume loss, likely sequelae of chronic  small vessel ischemic disease.     I have personally reviewed the examination and initial interpretation  and I agree with the findings.     ROCÍO TORRES MD     50-minutes were spent in evaluation, counseling, and documentation on the date of service.

## 2023-07-21 NOTE — NURSING NOTE
Is the patient currently in the state of [place]? YES    Visit mode:VIDEO    If the visit is dropped, the patient can be reconnected by: TELEPHONE VISIT: Phone number: [phone]    Will anyone else be joining the visit? NO      How would you like to obtain your AVS? MyChart    Are changes needed to the allergy or medication list? NO    Reason for visit: Video Visit (Follow-up )

## 2023-07-21 NOTE — LETTER
"7/21/2023       RE: Pat Barclay  [address]  [place] [address]     Dear Colleague,    Thank you for referring your patient, Pat Barclay, to the [facility] [place] at [facility]. Please see a copy of my visit note below.    The patient is being evaluated via a billable video visit.    How would you like to obtain your AVS? MyChart  If the video visit is dropped, the invitation should be resent by: Send to e-mail at: [email or web address]  Will anyone else be joining your video visit? 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 On exam she has downbeat nystagmus that can be elicited with head extension in all positions (right, center, left) which is associated with her vertigo. She has severely impaired smooth pursuit but no gaze-evoked nystagmus. This pattern is not typical of BPPV but is concerning for a central process, like a cerebellar degeneration or a lesion in the posterior fossa.      Because of her pace-maker, we started with a CT venogram so that we can see the vasculature at the skull base, which can be compressed with head extension. There is also a mild left sided dysmetria which also points to a cerebellar lesion. There is a flavor of syndromic-ness to her picture given the severe hearing loss, white matter disease, bradycardia, and now the central eye movement abnormality and ataxia.       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 Severe narrowing of the left internal jugular vein between the styloid process of the lateral edge of C1 is present on both neutral and extension positions.\"     On my review " "there was also loss of volume at the cerebellar vermis out of proportion to the rest of the brain and the rest of the cerebellum. The volume loss was so severe that it almost looked like a cyst. There is also loss of volume of the right occipital lobe and bilateral parietal lobes.      Interim History 8-26-22:  Trial of 4-AP, titrated to 10mg BID. She has just started on the goal dose. She has been on this for a little more than a month. When she gets up she feels lightheadedness. There is no vertigo. There is some sense of disorientation. There is no sense of oscillopsia. The trigger can be noise, standing up from sitting, head extension. If she is not moving her head, she feels okay. Her symptoms are only triggered by head movement. All direction of head movement are equally as bad. They are triggered only when she is standing up. She is fine moving her head when sitting or lying down. There is no headache or head pressure. No ear pain or ear pressure. There is sometimes ringing in the ears. There is no neck pain or tightness. There is no chest pain. No pelvic or abdominal pressure. There is no family history of similar problems.      She is not having any falls. She feels like she is \"running\" when she walks because her feet go faster than she wants them to. She does not endorse freezing of gait. She tends to walk in a zig-zag way. She walks like she is drunk.      She has mild dysarthria. Patient denies any visual problems.   Paraneoplastic panel, Anti-KRYSTA, Vitamin E were all normal 6-13-22     Impression: Potentially a syndrome of cerebellar, biparietal atrophy, white matter disease, hearing loss, bradycardia, with additional component of left internal jugular compression at the skull base. Before considering more tests for ataxia, I'd like to obtain  from my colleague Jc Araya, an ataxia specialist, so that we can proceed more efficiently. In the meantime, she should continue with the 4-AP as she " has just reached the goal dose.      Plan:   1. Continue with the 4-AP 10mg BID  2. Referral to Jc Araya, Ataxia Clinic regarding evaluation for genetic or other neurodegeneration disorders of ataxia given caudal cerebellum, parietal lobe, and occipital lobe atrophy.      INTERIM HISTORY 1/20/2023:  10-7-22: Appt Dr. Araya.  Did not detect ataxia or downbeat nystagmus (notably, patient was on 4-AP at the time).  Added TPO antibodies which were elevated at 312 but patient has known hypothyroidism and is on levothyroxine. Transglutaminase, TSH and thyroglobulin antibodies were negative.      10-7-22: GENETIC COUNSELING  We discussed the genetic and environmental basis of neurologic disease. I explained that her later age of onset and lack of clear family history would argue against a single gene cause for her symptoms.     She has been stable overall. She was a little worse in December, listening to music and singing in [employer] and felt more vertigo. She had to go home and lie down. She is better in January. She notices that being in situations where the body is shaken can make her worse.      She is taking 4-AP 10mg twice a day. She feels like disorientation on this medication. She does not have an improvement in balance. She fell twice on the snow. She does not fall in regular places indoors. She can hit the wall when she walks. She feels that her speech is fine.  There is no tremor.      There is no headache or head pressure. There are no muscle pains.      Exam: very mild downbeat nystagmus only on lateral gaze. Speech is normal. Content normal.  Plan:  1. Stay on 4-AP for 1 year. Consider trial of Ampyra if not noticing improvement  2. Autonomic function testing 2-3-23    Interim History 7/21/2023:  2-13-23 Autonomic testing [facility]. 50mmHg orthostatic drop. Concern for central autonomic disorder like MSA. Note that patient has a pacemaker. She does not remember her dizziness being triggered during the test.  "     She has been having dizzy spells that have worsened over the last 8 days. She thinks that they are her previous spell, just worse. She feels that the worsening was suddenly worse with no particularly worse.     There is sense that the world is moving around her, all directions, very frequently triggered but not present 100% of the time.  It is worsened by body movement (getting up from bed, getting up from the couch). It gets better lying down, taking a nap. It is completely better lying down and staying still. If she turns her head lying down but turning her head, she she feels light headed and feeling like her head is, \"swimming.\" Her head feels like there is pressure, worse when she stands up.     She is taking the 4-AP, 10mg BID. Getting it from [facility]. She didn't start a new bottle of pills when the symptoms worsened.     Exam:   Right now, sitting still, without moving the head, there's no vertigo. This could be triggered by standing up.     Turning her head to the right doesn't trigger, but triggers with returned to baseline.  Turning the head to the left doesn't trigger, but triggers with returned to baseline.  There is no vertigo with head extension, but triggers with returned to baseline.   There is no vertigo with head flexion, but triggers with returned to baseline.    The feeling is a swirling. There is no presyncope. It took less than a minute for the feeling to go away.     She stood up and felt the same as sitting. Whether sitting for 5-minutes or an hour would not make a difference.   She does feel that her balance is poor. She has been going to PT for balance training. She has not been falling.   There is no chronic pain in the head, neck, or arms. She does have headache twice a week experienced as a frontal pain, slight, well managed with ibuprofen or acetaminophen.     Plan:   1. Patient to measure blood pressure lying, sitting, standing position and correlate with intensity of " vertigo  2. If there is some room on the blood, trial of midodrine to raise the blood pressure to treat orthostatic hypotension  3. Continue with the 4-AP at 10mg BID  4. Broached the possibility of a neurodegenerative process    DATA:  I personally reviewed the following data.  Autonomic testing [facility]:  Conclusion  There is normal postganglionic sudomotor function, severe cardiovagal failure, and severe adrenergic failure.   Orthostatic hypotension is recorded.  HR responses may be obscured by pacemaker.   Normal postganglionic responses together with severe autonomic failure are suggestive of central autonomic causes such as synucleinopathy (MSA)    [employer] Thor Outlet Syn Bilat 5/12/2022      CLINICAL HISTORY: 71F with forehead pressure and vertigo with head  extension.; Compression of vein; TOS (thoracic outlet syndrome).     COMPARISONS: None available.    FINDINGS:  RIGHT:       REST:            INTERNAL JUGULAR VEIN: 54 cm/s, phasic, fully compressible            INNOMINATE VEIN: 25 cm/s, phasic            SUBCLAVIAN VEIN, medial: 42 cm/s, phasic            SUBCLAVIAN VEIN, mid: 31 cm/s, phasic, fully compressible            SUBCLAVIAN VEIN, lateral: 73 cm/s, phasic, fully  compressible            AXILLARY VEIN: 42 cm/s, phasic, fully compressible          MID SUBCLAVIAN VEIN, sitting erect:            0 degrees: 91 cm/s, phasic            90 degrees: 166 cm/s, phasic            135 degrees: 236 cm/s, phasic            180 degrees: 209 cm/s, phasic          INTERNAL JUGULAR VEIN, sitting erect:            Neutral: 145 cm/s, phasic            Right: 168 cm/s, phasic            Left: 166 cm/s, phasic            Extension: 154 cm/s, phasic            Flexion: 126 cm/s, phasic          PPGs:            Baseline: Normal            Arms 90: Normal            Arms 180: Severely dampened, flattened              : Normal             head right: Normal             head left: Normal             Onset: Normal     LEFT:       REST:            INTERNAL JUGULAR VEIN: 99 cm/s, phasic, fully compressible            INNOMINATE VEIN: 32 cm/s, phasic            SUBCLAVIAN VEIN, medial: 79 cm/s, phasic            SUBCLAVIAN VEIN, mid: 56 cm/s, phasic, fully compressible            SUBCLAVIAN VEIN, lateral: 36 cm/s, phasic, fully  compressible            AXILLARY VEIN: 53 cm/s, phasic, fully compressible          MID SUBCLAVIAN VEIN, sitting erect:            0 degrees: 28 cm/s, phasic            90 degrees: 124 cm/s, phasic,             135 degrees: 137 cm/s, phasic            180 degrees: 228 cm/s, phasic          INTERNAL JUGULAR VEIN, sitting erect:            Neutral: 97 cm/s, phasic            Right: 190 cm/s, phasic            Left: 75 cm/s, phasic            Extension: 92 cm/s, phasic            Flexion: 81 cm/s, phasic         PPGs:            Baseline: Normal            Arms 90: Normal            Arms 180: Severely dampened, flattened            : Normal             head right: Normal             head left: Normal            Onset: Normal  Impression: IMPRESSION: Thoracic outlet/inlet physiology suggested. Correlate for  symptoms.     1. RIGHT:       A. No subclavian venous stenosis suggested at rest.       B. increased velocities and turbulent flow in the subclavian vein  with maneuvers without cessation of flow.       C. Patent right internal jugular vein with increased velocities  during various maneuvers, without cessation of flow.       D. Loss of PPG waveform/severe dampening at 180 degrees arm  abduction, nonspecific. Remainder of maneuvers do not dampen PPG  waveforms.     2. LEFT:       A. No subclavian venous stenosis suggested at rest.       B. increased velocities and turbulent flow in the subclavian vein  with maneuvers without cessation of flow.       C. Patent left internal jugular vein with increased velocities  during various maneuvers, without cessation of  flow.       D. Loss of PPG waveform/severe dampening at 180 degrees arm  abduction, nonspecific. Remainder of maneuvers do not dampen PPG  waveforms.  .     I have personally reviewed the examination and initial interpretation  and I agree with the findings.     ELSA TAMAYO MD         SYSTEM ID:  [id number]  CTV HEAD NECK WITH CONTRAST 5/12/2022      History:  71F with head extension induced vertigo with downbeat  nystagmus. Question cerebellar degeneration, venous stenosis, styloid  length,.; Compression of vein; Nystagmus; TOS (thoracic outlet  syndrome)  ICD-10: Compression of vein; Nystagmus; TOS (thoracic outlet syndrome)      Findings: Diminutive size of the left transverse, sigmoid, and  internal jugular vein relative to the right. The left internal jugular  vein then is markedly narrowed between the styloid process and  transverse process of C1 which also persists on the extension images.  No intraluminal filling defects visualized intracranially or  extracranially. Bilateral subclavian and axillary veins are widely  patent without evidence for stenosis with the arms down.     There is no mass effect, midline shift, or  intracranial hemorrhage.   The ventricles are proportionate to the cerebral sulci. The gray-white  matter differentiation of the cerebral hemispheres is preserved.  Postcontrast images demonstrate no abnormal intracranial parenchymal  or meningeal enhancement. Moderate diffuse cerebral atrophy. Unchanged  asymmetric volume loss in the right parasagittal occipital lobe.      The orbits, visualized portions of paranasal sinuses, and mastoid air  cells are relatively clear.      Scattered multilevel cervical spondylosis without significant spinal  canal or neuroforaminal narrowing. No suspicious sclerotic or lucent  osseous lesion.     Cervical soft tissues are unremarkable. Thyroid is unremarkable.     Partially visualized left chest wall ICD/Pacemaker.  Impression: Impression:    1. 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 Significance is uncertain given  the small size of this vein inferiorly. No intraluminal thrombus or  significant extraluminal stenosis in either the intracranial or  extracranial veins.  2. No CT evidence for thoracic outlet syndrome.  3. Moderate diffuse cerebral volume loss, likely sequelae of chronic  small vessel ischemic disease.     I have personally reviewed the examination and initial interpretation  and I agree with the findings.     ROCÍO TORRES MD     50-minutes were spent in evaluation, counseling, and documentation on the date of service.          Again, thank you for allowing me to participate in the care of your patient.      Sincerely,    Elva PEDROZA Cha, MD

## 2023-07-21 NOTE — PROGRESS NOTES
"Virtual Visit Details    Type of service:  Video Visit   Video Start Time: {video visit start/end time for provider to select:931829}  Video End Time:{video visit start/end time for provider to select:793802}    Originating Location (pt. Location): {video visit patient location:260446::\"Home\"}  {PROVIDER LOCATION On-site should be selected for visits conducted from your clinic location or adjoining [facility] hospital, academic office, or other nearby [facility] building. Off-site should be selected for all other provider locations, including home:602849}  Distant Location (provider location):  {virtual location provider:762374}  Platform used for Video Visit: {Virtual Visit Platforms:301273::\"[employer]\"}  "

## 2023-08-25 ENCOUNTER — VIRTUAL VISIT (OUTPATIENT)
Dept: NEUROLOGY | Facility: CLINIC | Age: 73
End: 2023-08-25
Payer: MEDICARE

## 2023-08-25 DIAGNOSIS — I95.1 ORTHOSTATIC HYPOTENSION: Primary | ICD-10-CM

## 2023-08-25 DIAGNOSIS — H55.09 DOWNBEAT NYSTAGMUS: ICD-10-CM

## 2023-08-25 DIAGNOSIS — R27.0 ATAXIA: ICD-10-CM

## 2023-08-25 PROCEDURE — 99214 OFFICE O/P EST MOD 30 MIN: CPT | Mod: 95 | Performed by: PSYCHIATRY & NEUROLOGY

## 2023-08-25 RX ORDER — PYRIDOSTIGMINE BROMIDE 60 MG/1
TABLET ORAL
Qty: 30 TABLET | Refills: 3 | Status: SHIPPED | OUTPATIENT
Start: 2023-08-25 | End: 2023-11-22

## 2023-08-25 NOTE — PROGRESS NOTES
"Virtual Visit Details    Type of service:  Video Visit   Video Start Time: {video visit start/end time for provider to select:080739}  Video End Time:{video visit start/end time for provider to select:435682}    Originating Location (pt. Location): {video visit patient location:270950::\"Home\"}  {PROVIDER LOCATION On-site should be selected for visits conducted from your clinic location or adjoining [facility] hospital, academic office, or other nearby [facility] building. Off-site should be selected for all other provider locations, including home:121775}  Distant Location (provider location):  {virtual location provider:273119}  Platform used for Video Visit: {Virtual Visit Platforms:505827::\"[employer]\"}    "

## 2023-08-25 NOTE — PROGRESS NOTES
"The patient is being evaluated via a billable video visit.    How would you like to obtain your AVS? MyChart  If the video visit is dropped, the invitation should be resent by: Send to e-mail at: [email or web address]  Will anyone else be joining your video visit? No      Video-Visit Details  Type of service:  Video Visit  Video Start Time:4:25 PM  Video End Time:4:46 PM  Originating Location (pt. Location): Home  Distant Location (provider location):  [facility]UROLOG[facility]   Platform used for Video Visit: 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 On exam she has downbeat nystagmus that can be elicited with head extension in all positions (right, center, left) which is associated with her vertigo. She has severely impaired smooth pursuit but no gaze-evoked nystagmus. This pattern is not typical of BPPV but is concerning for a central process, like a cerebellar degeneration or a lesion in the posterior fossa.      Because of her pace-maker, we started with a CT venogram so that we can see the vasculature at the skull base, which can be compressed with head extension. There is also a mild left sided dysmetria which also points to a cerebellar lesion. There is a flavor of syndromic-ness to her picture given the severe hearing loss, white matter disease, bradycardia, and now the central eye movement abnormality and ataxia.       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 Severe narrowing of the left internal jugular vein between the styloid process of the lateral edge of C1 is present on both neutral and extension positions.\"     On my review there was also loss of " "volume at the cerebellar vermis out of proportion to the rest of the brain and the rest of the cerebellum. The volume loss was so severe that it almost looked like a cyst. There is also loss of volume of the right occipital lobe and bilateral parietal lobes.      Interim History 8-26-22:  Trial of 4-AP, titrated to 10mg BID. She has just started on the goal dose. She has been on this for a little more than a month. When she gets up she feels lightheadedness. There is no vertigo. There is some sense of disorientation. There is no sense of oscillopsia. The trigger can be noise, standing up from sitting, head extension. If she is not moving her head, she feels okay. Her symptoms are only triggered by head movement. All direction of head movement are equally as bad. They are triggered only when she is standing up. She is fine moving her head when sitting or lying down. There is no headache or head pressure. No ear pain or ear pressure. There is sometimes ringing in the ears. There is no neck pain or tightness. There is no chest pain. No pelvic or abdominal pressure. There is no family history of similar problems.      She is not having any falls. She feels like she is \"running\" when she walks because her feet go faster than she wants them to. She does not endorse freezing of gait. She tends to walk in a zig-zag way. She walks like she is drunk.      She has mild dysarthria. Patient denies any visual problems.   Paraneoplastic panel, Anti-KRYSTA, Vitamin E were all normal 6-13-22     Impression: Potentially a syndrome of cerebellar, biparietal atrophy, white matter disease, hearing loss, bradycardia, with additional component of left internal jugular compression at the skull base. Before considering more tests for ataxia, I'd like to obtain  from my colleague Jc Araya, an ataxia specialist, so that we can proceed more efficiently. In the meantime, she should continue with the 4-AP as she has just reached the " goal dose.      Plan:   1. Continue with the 4-AP 10mg BID  2. Referral to Jc Araya, [facility] regarding evaluation for genetic or other neurodegeneration disorders of ataxia given caudal cerebellum, parietal lobe, and occipital lobe atrophy.      INTERIM HISTORY 1/20/2023:  10-7-22: Appt Dr. Araya. Did not detect ataxia or downbeat nystagmus (notably, patient was on 4-AP at the time).  Added TPO antibodies which were elevated at 312 but patient has known hypothyroidism and is on levothyroxine. Transglutaminase, TSH and thyroglobulin antibodies were negative.      10-7-22: GENETIC COUNSELING  We discussed the genetic and environmental basis of neurologic disease. I explained that her later age of onset and lack of clear family history would argue against a single gene cause for her symptoms.     She has been stable overall. She was a little worse in December, listening to music and singing in [employer] and felt more vertigo. She had to go home and lie down. She is better in January. She notices that being in situations where the body is shaken can make her worse.      She is taking 4-AP 10mg twice a day. She feels like disorientation on this medication. She does not have an improvement in balance. She fell twice on the snow. She does not fall in regular places indoors. She can hit the wall when she walks. She feels that her speech is fine.  There is no tremor.      There is no headache or head pressure. There are no muscle pains.      Exam: very mild downbeat nystagmus only on lateral gaze. Speech is normal. Content normal.  Plan:  1. Stay on 4-AP for 1 year. Consider trial of Ampyra if not noticing improvement  2. Autonomic function testing 2-3-23     Interim History 7/21/2023:  2[place]13[place]23 [place] [facility] [place]. Concern for central autonomic disorder like MSA. Note that patient has a pacemaker. She does not remember her dizziness being triggered during the test.      She has been  "having dizzy spells that have worsened over the last 8 days. She thinks that they are her previous spell, just worse. She feels that the worsening was suddenly worse with no particularly worse.      There is sense that the world is moving around her, all directions, very frequently triggered but not present 100% of the time.  It is worsened by body movement (getting up from bed, getting up from the couch). It gets better lying down, taking a nap. It is completely better lying down and staying still. If she turns her head lying down but turning her head, she she feels light headed and feeling like her head is, \"swimming.\" Her head feels like there is pressure, worse when she stands up.      She is taking the 4-AP, 10mg BID. Getting it from [facility]. She didn't start a new bottle of pills when the symptoms worsened.      Exam:   Right now, sitting still, without moving the head, there's no vertigo. This could be triggered by standing up.      Turning to the right, left, extension, flexion can all worsen the feeling floating but they are all the same intensity of worsening.   The feeling is a swirling. There is no presyncope. It took less than a minute for the feeling to go away.      She stood up and felt the same as sitting. Whether sitting for 5-minutes or an hour would not make a difference.   She does feel that her balance is poor. She has been going to PT for balance training. She has not been falling.   There is no chronic pain in the head, neck, or arms. She does have headache twice a week experienced as a frontal pain, slight, well managed with ibuprofen or acetaminophen.      Plan:   1. Patient to measure blood pressure lying, sitting, standing position and correlate with intensity of vertigo  2. If there is some room on the blood, trial of midodrine to raise the blood pressure to treat orthostatic hypotension  3. Continue with the 4-AP at 10mg BID  4. Broached the possibility of a neurodegenerative " process    Interim History 8/25/2023:  There is a sense of floating like the top of head is not connected to her body. There is still no vertigo.  This is triggered by standing up.     Turning to the right, left, extension, flexion can all worsen the feeling floating but they are all the same intensity of worsening.   The feeling is a swirling. There is no presyncope. It took less than a minute for the feeling to go away.     Still getting the 4-AP 10mg BID from [facility]. No side effects.   Patient sent blood pressure recordings. There is some correlation with her dizziness with standing and blood pressure drops.    We discussed different options of augmenting blood pressure including Mestinon, Florinef, and Midodrine. We'll start with the Mestinon as there as she may have a more neurogenic cause of her autonomic dysfunction.     PLAN:   Mestinon first 1/4 tablet of 60mg Q8hrs, titrated to 1/2 tablet to Q8hrs if tolerated.  2. Continue 4 AP for now  3. Follow-up 6 months    DATA:  I personally reviewed the following data.    Last brain imaging:  US Up Ex [facility] Josafat Thor Outlet Syn Bilat  Narrative: THORACIC INLET/OUTLET DUPLEX ULTRASOUND 5/12/2022 10:56 AM    CLINICAL HISTORY: 71F with forehead pressure and vertigo with head  extension.; Compression of vein; TOS (thoracic outlet syndrome).    COMPARISONS: None available.    REFERRING PROVIDER: GISELLE COKER CHA    TECHNIQUE: Bilateral innominate, subclavian, and axillary veins were  evaluated grayscale, color Doppler, Doppler waveform ultrasound.  Bilateral subclavian veins were evaluated with color Doppler and  Doppler waveform imaging through abduction maneuvers.    Bilateral index finger PPG's obtained at rest and with provocative  positions.    Bilateral internal jugular veins evaluated at rest with grayscale,  color Doppler, and Doppler waveform ultrasound. Bilateral internal  jugular veins evaluated with color Doppler and Doppler waveform  ultrasound through  maneuvers.    FINDINGS:  RIGHT:       REST:            INTERNAL JUGULAR VEIN: 54 cm/s, phasic, fully compressible            INNOMINATE VEIN: 25 cm/s, phasic            SUBCLAVIAN VEIN, medial: 42 cm/s, phasic            SUBCLAVIAN VEIN, mid: 31 cm/s, phasic, fully compressible            SUBCLAVIAN VEIN, lateral: 73 cm/s, phasic, fully  compressible            AXILLARY VEIN: 42 cm/s, phasic, fully compressible         MID SUBCLAVIAN VEIN, sitting erect:            0 degrees: 91 cm/s, phasic            90 degrees: 166 cm/s, phasic            135 degrees: 236 cm/s, phasic            180 degrees: 209 cm/s, phasic         INTERNAL JUGULAR VEIN, sitting erect:            Neutral: 145 cm/s, phasic            Right: 168 cm/s, phasic            Left: 166 cm/s, phasic            Extension: 154 cm/s, phasic            Flexion: 126 cm/s, phasic         PPGs:            Baseline: Normal            Arms 90: Normal            Arms 180: Severely dampened, flattened              : Normal             head right: Normal             head left: Normal            Onset: Normal    LEFT:       REST:            INTERNAL JUGULAR VEIN: 99 cm/s, phasic, fully compressible            INNOMINATE VEIN: 32 cm/s, phasic            SUBCLAVIAN VEIN, medial: 79 cm/s, phasic            SUBCLAVIAN VEIN, mid: 56 cm/s, phasic, fully compressible            SUBCLAVIAN VEIN, lateral: 36 cm/s, phasic, fully  compressible            AXILLARY VEIN: 53 cm/s, phasic, fully compressible         MID SUBCLAVIAN VEIN, sitting erect:            0 degrees: 28 cm/s, phasic            90 degrees: 124 cm/s, phasic,             135 degrees: 137 cm/s, phasic            180 degrees: 228 cm/s, phasic         INTERNAL JUGULAR VEIN, sitting erect:            Neutral: 97 cm/s, phasic            Right: 190 cm/s, phasic            Left: 75 cm/s, phasic            Extension: 92 cm/s, phasic            Flexion: 81 cm/s, phasic        PPGs:             Baseline: Normal            Arms 90: Normal            Arms 180: Severely dampened, flattened            : Normal             head right: Normal             head left: Normal            Onset: Normal  Impression: IMPRESSION: Thoracic outlet/inlet physiology suggested. Correlate for  symptoms.    1. RIGHT:       A. No subclavian venous stenosis suggested at rest.       B. increased velocities and turbulent flow in the subclavian vein  with maneuvers without cessation of flow.       C. Patent right internal jugular vein with increased velocities  during various maneuvers, without cessation of flow.       D. Loss of PPG waveform/severe dampening at 180 degrees arm  abduction, nonspecific. Remainder of maneuvers do not dampen PPG  waveforms.    2. LEFT:       A. No subclavian venous stenosis suggested at rest.       B. increased velocities and turbulent flow in the subclavian vein  with maneuvers without cessation of flow.       C. Patent left internal jugular vein with increased velocities  during various maneuvers, without cessation of flow.       D. Loss of PPG waveform/severe dampening at 180 degrees arm  abduction, nonspecific. Remainder of maneuvers do not dampen PPG  waveforms.  .    I have personally reviewed the examination and initial interpretation  and I agree with the findings.    ELSA TAMAYO MD         SYSTEM ID:  [id number]  CTV Head w Contrast  Narrative: CTV HEAD NECK WITH CONTRAST 5/12/2022 9:22 AM    History:  71F with head extension induced vertigo with downbeat  nystagmus. Question cerebellar degeneration, venous stenosis, styloid  length,.; Compression of vein; Nystagmus; TOS (thoracic outlet  syndrome)  ICD-10: Compression of vein; Nystagmus; TOS (thoracic outlet syndrome)     Comparison: Brain MRI 9/15/2021, 5/1/2008      Technique: Helically acquired thin section axial CT images were  obtained with 1 mm collimation through the brain after intravenous  bolus injection  of iodinated contrast medium with an approximately  20-25 second delay between administration of contrast and scanning.  Image data were sent to the [employer] 3D workstation and postprocessed by  the radiologist using maximum intensity pixel (MIP), multiplanar and  volume rendered 3D reconstruction programs.     Contrast: Isovue 370 75cc    Findings: Diminutive size of the left transverse, sigmoid, and  internal jugular vein relative to the right. The left internal jugular  vein then is markedly narrowed between the styloid process and  transverse process of C1 which also persists on the extension images.  No intraluminal filling defects visualized intracranially or  extracranially. Bilateral subclavian and axillary veins are widely  patent without evidence for stenosis with the arms down.    There is no mass effect, midline shift, or  intracranial hemorrhage.   The ventricles are proportionate to the cerebral sulci. The gray-white  matter differentiation of the cerebral hemispheres is preserved.  Postcontrast images demonstrate no abnormal intracranial parenchymal  or meningeal enhancement. Moderate diffuse cerebral atrophy. Unchanged  asymmetric volume loss in the right parasagittal occipital lobe.     The orbits, visualized portions of paranasal sinuses, and mastoid air  cells are relatively clear.     Scattered multilevel cervical spondylosis without significant spinal  canal or neuroforaminal narrowing. No suspicious sclerotic or lucent  osseous lesion.    Cervical soft tissues are unremarkable. Thyroid is unremarkable.    Partially visualized left chest wall ICD/Pacemaker.  Impression: Impression:    1. 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 Significance is uncertain given  the small size of this vein  inferiorly. No intraluminal thrombus or  significant extraluminal stenosis in either the intracranial or  extracranial veins.  2. No CT evidence for thoracic outlet syndrome.  3. Moderate diffuse cerebral volume loss, likely sequelae of chronic  small vessel ischemic disease.    I have personally reviewed the examination and initial interpretation  and I agree with the findings.    ROCÍO TORRES MD         SYSTEM ID:  [id number]    34-minutes were spent in evaluation, counseling, and documentation on the date of service.

## 2023-08-25 NOTE — LETTER
"8/25/2023       RE: Pat Barclay  [address]  [place] [address]     Dear Colleague,    Thank you for referring your patient, Pat Barclay, to the [facility] [place] [facility]. Please see a copy of my visit note below.    The patient is being evaluated via a billable video visit.    How would you like to obtain your AVS? MyChart  If the video visit is dropped, the invitation should be resent by: Send to e-mail at: [email or web address]  Will anyone else be joining your video visit? 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 On exam she has downbeat nystagmus that can be elicited with head extension in all positions (right, center, left) which is associated with her vertigo. She has severely impaired smooth pursuit but no gaze-evoked nystagmus. This pattern is not typical of BPPV but is concerning for a central process, like a cerebellar degeneration or a lesion in the posterior fossa.      Because of her pace-maker, we started with a CT venogram so that we can see the vasculature at the skull base, which can be compressed with head extension. There is also a mild left sided dysmetria which also points to a cerebellar lesion. There is a flavor of syndromic-ness to her picture given the severe hearing loss, white matter disease, bradycardia, and now the central eye movement abnormality and ataxia.       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 Severe narrowing of the left internal jugular vein between the styloid process of the lateral edge of C1 is present on both neutral and extension positions.\"     On " "my review there was also loss of volume at the cerebellar vermis out of proportion to the rest of the brain and the rest of the cerebellum. The volume loss was so severe that it almost looked like a cyst. There is also loss of volume of the right occipital lobe and bilateral parietal lobes.      Interim History 8-26-22:  Trial of 4-AP, titrated to 10mg BID. She has just started on the goal dose. She has been on this for a little more than a month. When she gets up she feels lightheadedness. There is no vertigo. There is some sense of disorientation. There is no sense of oscillopsia. The trigger can be noise, standing up from sitting, head extension. If she is not moving her head, she feels okay. Her symptoms are only triggered by head movement. All direction of head movement are equally as bad. They are triggered only when she is standing up. She is fine moving her head when sitting or lying down. There is no headache or head pressure. No ear pain or ear pressure. There is sometimes ringing in the ears. There is no neck pain or tightness. There is no chest pain. No pelvic or abdominal pressure. There is no family history of similar problems.      She is not having any falls. She feels like she is \"running\" when she walks because her feet go faster than she wants them to. She does not endorse freezing of gait. She tends to walk in a zig-zag way. She walks like she is drunk.      She has mild dysarthria. Patient denies any visual problems.   Paraneoplastic panel, Anti-KRYSTA, Vitamin E were all normal 6-13-22     Impression: Potentially a syndrome of cerebellar, biparietal atrophy, white matter disease, hearing loss, bradycardia, with additional component of left internal jugular compression at the skull base. Before considering more tests for ataxia, I'd like to obtain  from my colleague Jc Araya, an ataxia specialist, so that we can proceed more efficiently. In the meantime, she should continue with the " 4-AP as she has just reached the goal dose.      Plan:   1. Continue with the 4-AP 10mg BID  2. Referral to Jc Araya, Ataxia Clinic regarding evaluation for genetic or other neurodegeneration disorders of ataxia given caudal cerebellum, parietal lobe, and occipital lobe atrophy.      INTERIM HISTORY 1/20/2023:  10-7-22: Appt Dr. Araya. Did not detect ataxia or downbeat nystagmus (notably, patient was on 4-AP at the time).  Added TPO antibodies which were elevated at 312 but patient has known hypothyroidism and is on levothyroxine. Transglutaminase, TSH and thyroglobulin antibodies were negative.      10-7-22: GENETIC COUNSELING  We discussed the genetic and environmental basis of neurologic disease. I explained that her later age of onset and lack of clear family history would argue against a single gene cause for her symptoms.     She has been stable overall. She was a little worse in December, listening to music and singing in [employer] and felt more vertigo. She had to go home and lie down. She is better in January. She notices that being in situations where the body is shaken can make her worse.      She is taking 4-AP 10mg twice a day. She feels like disorientation on this medication. She does not have an improvement in balance. She fell twice on the snow. She does not fall in regular places indoors. She can hit the wall when she walks. She feels that her speech is fine.  There is no tremor.      There is no headache or head pressure. There are no muscle pains.      Exam: very mild downbeat nystagmus only on lateral gaze. Speech is normal. Content normal.  Plan:  1. Stay on 4-AP for 1 year. Consider trial of Ampyra if not noticing improvement  2. Autonomic function testing 2-3-23     Interim History 7/21/2023:  2-13-23 Autonomic testing [facility]. 50mmHg orthostatic drop. Concern for central autonomic disorder like MSA. Note that patient has a pacemaker. She does not remember her dizziness being triggered  "during the test.      She has been having dizzy spells that have worsened over the last 8 days. She thinks that they are her previous spell, just worse. She feels that the worsening was suddenly worse with no particularly worse.      There is sense that the world is moving around her, all directions, very frequently triggered but not present 100% of the time.  It is worsened by body movement (getting up from bed, getting up from the couch). It gets better lying down, taking a nap. It is completely better lying down and staying still. If she turns her head lying down but turning her head, she she feels light headed and feeling like her head is, \"swimming.\" Her head feels like there is pressure, worse when she stands up.      She is taking the 4-AP, 10mg BID. Getting it from [facility]. She didn't start a new bottle of pills when the symptoms worsened.      Exam:   Right now, sitting still, without moving the head, there's no vertigo. This could be triggered by standing up.      Turning to the right, left, extension, flexion can all worsen the feeling floating but they are all the same intensity of worsening.   The feeling is a swirling. There is no presyncope. It took less than a minute for the feeling to go away.      She stood up and felt the same as sitting. Whether sitting for 5-minutes or an hour would not make a difference.   She does feel that her balance is poor. She has been going to  for balance training. She has not been falling.   There is no chronic pain in the head, neck, or arms. She does have headache twice a week experienced as a frontal pain, slight, well managed with ibuprofen or acetaminophen.      Plan:   1. Patient to measure blood pressure lying, sitting, standing position and correlate with intensity of vertigo  2. If there is some room on the blood, trial of midodrine to raise the blood pressure to treat orthostatic hypotension  3. Continue with the 4-AP at 10mg BID  4. Broached the " possibility of a neurodegenerative process    Interim History 8/25/2023:  There is a sense of floating like the top of head is not connected to her body. There is still no vertigo.  This is triggered by standing up.     Turning to the right, left, extension, flexion can all worsen the feeling floating but they are all the same intensity of worsening.   The feeling is a swirling. There is no presyncope. It took less than a minute for the feeling to go away.     Still getting the 4-AP 10mg BID from [facility]. No side effects.   Patient sent blood pressure recordings. There is some correlation with her dizziness with standing and blood pressure drops.    We discussed different options of augmenting blood pressure including Mestinon, Florinef, and Midodrine. We'll start with the Mestinon as there as she may have a more neurogenic cause of her autonomic dysfunction.     PLAN:   Mestinon first 1/4 tablet of 60mg Q8hrs, titrated to 1/2 tablet to Q8hrs if tolerated.  2. Continue 4 AP for now  3. Follow-up 6 months    DATA:  I personally reviewed the following data.    Last brain imaging:  US Up Ex Art [facility] Josafat Thor Outlet Syn Bilat  Narrative: THORACIC INLET/OUTLET DUPLEX ULTRASOUND 5/12/2022 10:56 AM    CLINICAL HISTORY: 71F with forehead pressure and vertigo with head  extension.; Compression of vein; TOS (thoracic outlet syndrome).    COMPARISONS: None available.    REFERRING PROVIDER: GISELLE COKER CHA    TECHNIQUE: Bilateral innominate, subclavian, and axillary veins were  evaluated grayscale, color Doppler, Doppler waveform ultrasound.  Bilateral subclavian veins were evaluated with color Doppler and  Doppler waveform imaging through abduction maneuvers.    Bilateral index finger PPG's obtained at rest and with provocative  positions.    Bilateral internal jugular veins evaluated at rest with grayscale,  color Doppler, and Doppler waveform ultrasound. Bilateral internal  jugular veins evaluated with color Doppler and  Doppler waveform  ultrasound through maneuvers.    FINDINGS:  RIGHT:       REST:            INTERNAL JUGULAR VEIN: 54 cm/s, phasic, fully compressible            INNOMINATE VEIN: 25 cm/s, phasic            SUBCLAVIAN VEIN, medial: 42 cm/s, phasic            SUBCLAVIAN VEIN, mid: 31 cm/s, phasic, fully compressible            SUBCLAVIAN VEIN, lateral: 73 cm/s, phasic, fully  compressible            AXILLARY VEIN: 42 cm/s, phasic, fully compressible         MID SUBCLAVIAN VEIN, sitting erect:            0 degrees: 91 cm/s, phasic            90 degrees: 166 cm/s, phasic            135 degrees: 236 cm/s, phasic            180 degrees: 209 cm/s, phasic         INTERNAL JUGULAR VEIN, sitting erect:            Neutral: 145 cm/s, phasic            Right: 168 cm/s, phasic            Left: 166 cm/s, phasic            Extension: 154 cm/s, phasic            Flexion: 126 cm/s, phasic         PPGs:            Baseline: Normal            Arms 90: Normal            Arms 180: Severely dampened, flattened              : Normal             head right: Normal             head left: Normal            Onset: Normal    LEFT:       REST:            INTERNAL JUGULAR VEIN: 99 cm/s, phasic, fully compressible            INNOMINATE VEIN: 32 cm/s, phasic            SUBCLAVIAN VEIN, medial: 79 cm/s, phasic            SUBCLAVIAN VEIN, mid: 56 cm/s, phasic, fully compressible            SUBCLAVIAN VEIN, lateral: 36 cm/s, phasic, fully  compressible            AXILLARY VEIN: 53 cm/s, phasic, fully compressible         MID SUBCLAVIAN VEIN, sitting erect:            0 degrees: 28 cm/s, phasic            90 degrees: 124 cm/s, phasic,             135 degrees: 137 cm/s, phasic            180 degrees: 228 cm/s, phasic         INTERNAL JUGULAR VEIN, sitting erect:            Neutral: 97 cm/s, phasic            Right: 190 cm/s, phasic            Left: 75 cm/s, phasic            Extension: 92 cm/s, phasic            Flexion: 81 cm/s,  phasic        PPGs:            Baseline: Normal            Arms 90: Normal            Arms 180: Severely dampened, flattened            : Normal             head right: Normal             head left: Normal            Onset: Normal  Impression: IMPRESSION: Thoracic outlet/inlet physiology suggested. Correlate for  symptoms.    1. RIGHT:       A. No subclavian venous stenosis suggested at rest.       B. increased velocities and turbulent flow in the subclavian vein  with maneuvers without cessation of flow.       C. Patent right internal jugular vein with increased velocities  during various maneuvers, without cessation of flow.       D. Loss of PPG waveform/severe dampening at 180 degrees arm  abduction, nonspecific. Remainder of maneuvers do not dampen PPG  waveforms.    2. LEFT:       A. No subclavian venous stenosis suggested at rest.       B. increased velocities and turbulent flow in the subclavian vein  with maneuvers without cessation of flow.       C. Patent left internal jugular vein with increased velocities  during various maneuvers, without cessation of flow.       D. Loss of PPG waveform/severe dampening at 180 degrees arm  abduction, nonspecific. Remainder of maneuvers do not dampen PPG  waveforms.  .    I have personally reviewed the examination and initial interpretation  and I agree with the findings.    ELSA TAMAYO MD         SYSTEM ID:  [id number]  CTV Head w Contrast  Narrative: CTV HEAD NECK WITH CONTRAST 5/12/2022 9:22 AM    History:  71F with head extension induced vertigo with downbeat  nystagmus. Question cerebellar degeneration, venous stenosis, styloid  length,.; Compression of vein; Nystagmus; TOS (thoracic outlet  syndrome)  ICD-10: Compression of vein; Nystagmus; TOS (thoracic outlet syndrome)     Comparison: Brain MRI 9/15/2021, 5/1/2008      Technique: Helically acquired thin section axial CT images were  obtained with 1 mm collimation through the brain  after intravenous  bolus injection of iodinated contrast medium with an approximately  20-25 second delay between administration of contrast and scanning.  Image data were sent to the [employer] 3D workstation and postprocessed by  the radiologist using maximum intensity pixel (MIP), multiplanar and  volume rendered 3D reconstruction programs.     Contrast: Isovue 370 75cc    Findings: Diminutive size of the left transverse, sigmoid, and  internal jugular vein relative to the right. The left internal jugular  vein then is markedly narrowed between the styloid process and  transverse process of C1 which also persists on the extension images.  No intraluminal filling defects visualized intracranially or  extracranially. Bilateral subclavian and axillary veins are widely  patent without evidence for stenosis with the arms down.    There is no mass effect, midline shift, or  intracranial hemorrhage.   The ventricles are proportionate to the cerebral sulci. The gray-white  matter differentiation of the cerebral hemispheres is preserved.  Postcontrast images demonstrate no abnormal intracranial parenchymal  or meningeal enhancement. Moderate diffuse cerebral atrophy. Unchanged  asymmetric volume loss in the right parasagittal occipital lobe.     The orbits, visualized portions of paranasal sinuses, and mastoid air  cells are relatively clear.     Scattered multilevel cervical spondylosis without significant spinal  canal or neuroforaminal narrowing. No suspicious sclerotic or lucent  osseous lesion.    Cervical soft tissues are unremarkable. Thyroid is unremarkable.    Partially visualized left chest wall ICD/Pacemaker.  Impression: Impression:    1. 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 Significance is uncertain given  the  small size of this vein inferiorly. No intraluminal thrombus or  significant extraluminal stenosis in either the intracranial or  extracranial veins.  2. No CT evidence for thoracic outlet syndrome.  3. Moderate diffuse cerebral volume loss, likely sequelae of chronic  small vessel ischemic disease.    I have personally reviewed the examination and initial interpretation  and I agree with the findings.    ROCÍO TORRES MD         SYSTEM ID:  [id number]    34-minutes were spent in evaluation, counseling, and documentation on the date of service.      Again, thank you for allowing me to participate in the care of your patient.      Sincerely,    Elva PEDROZA Cha, MD

## 2023-08-25 NOTE — NURSING NOTE
"Is the patient currently in the state of [place]? YES    Visit mode:VIDEO    If the visit is dropped, the patient can be reconnected by: VIDEO VISIT: Text to cell phone:   Telephone Information:   Mobile [phone]       Will anyone else be joining the visit?  Nate and patient   (If patient encounters technical issues they should call [phone]    How would you like to obtain your AVS? MyChart    Are changes needed to the allergy or medication list? Pt stated no changes to allergies and Pt stated no med changes    Reason for visit: RECHECK (Patient notes concerns regarding low blood pressure when going from lying to sitting to standing. Patient notes feeling like she is \"floating\"all the time. )    Cristy WATERMAN     "

## 2023-09-18 ENCOUNTER — TELEPHONE (OUTPATIENT)
Dept: NEUROLOGY | Facility: CLINIC | Age: 73
End: 2023-09-18
Payer: MEDICARE

## 2023-09-18 NOTE — TELEPHONE ENCOUNTER
"[facility] Call Center    Phone Message    May a detailed message be left on voicemail: yes     Reason for Call: Other: Pt is calling because she states that she cannot get into her mychart and is wanting to discuss what she has been talking about with Dr. Segundo. Pt states that she took a medication and her blood pressure was \"weird\" Please call Pt to discuss     Action Taken: Message routed to:  [facility] Neurology    Travel Screening: Not Applicable                                                                   "

## 2023-09-19 ENCOUNTER — TELEPHONE (OUTPATIENT)
Dept: NEUROLOGY | Facility: CLINIC | Age: 73
End: 2023-09-19
Payer: MEDICARE

## 2023-09-21 NOTE — TELEPHONE ENCOUNTER
[facility] Call Center    Phone Message    May a detailed message be left on voicemail: yes     Reason for Call: Symptoms or Concerns     If patient has red-flag symptoms, warm transfer to triage line    Current symptom or concern: BP low, swirling feeling, dizziness, heavy chest    Symptoms have been present for:  1 week(s)    Has patient previously been seen for this? Yes    By : Dr. Segundo    Date: 09/21/23    Are there any new or worsening symptoms? Yes: Pt states she been having times were she feels a heavy chest and a swirling sensation.  Pt also states her BP has been low and is wondering if she should be seen by Cardiologist.     Please call Pt back at [phone] to advise.    Action Taken: Message routed to:  [facility] Neurology    Travel Screening: Not Applicable

## 2023-09-25 ENCOUNTER — MYC MEDICAL ADVICE (OUTPATIENT)
Dept: NEUROLOGY | Facility: CLINIC | Age: 73
End: 2023-09-25
Payer: MEDICARE

## 2023-09-26 NOTE — TELEPHONE ENCOUNTER
Called pt and left message that I sent her a my chart message and ask that she respond back to the message via my chart or call the clinic. Clinic # given

## 2023-09-26 NOTE — TELEPHONE ENCOUNTER
[facility] Call Center    Phone Message    May a detailed message be left on voicemail: yes     Reason for Call: Other: Pt called to speak with Lucita Dicskon RN. Pt and writer see appt should be rescheduled to 12-15-23 at 4, however writer does not have that appt template to schedule.  Pt would like to speak to care team about symptoms in previous message along with appt.    Please call Pt back at [phone]     Action Taken: Message routed to:  [facility] Neurology    Travel Screening: Not Applicable

## 2023-10-13 NOTE — TELEPHONE ENCOUNTER
Called pt and left message that I have sent her a my chart message and am waiting for a reply regarding an upcoming appt.  Please reply or call the clinic at at [phone].

## 2023-11-22 DIAGNOSIS — I95.1 ORTHOSTATIC HYPOTENSION: ICD-10-CM

## 2023-11-22 RX ORDER — PYRIDOSTIGMINE BROMIDE 60 MG/1
TABLET ORAL
Qty: 30 TABLET | Refills: 3 | Status: SHIPPED | OUTPATIENT
Start: 2023-11-22 | End: 2024-07-26

## 2023-11-22 NOTE — TELEPHONE ENCOUNTER
RX Authorization      Medication: pyRIDostigmine (MESTINON) 60 MG tablet     Date last refill ordered: 8/25/23    Quantity ordered: 30 tablet    # refills:  3    Date of last clinic visit with ordering provider: 8/25/23    Date of next clinic visit with ordering provider:    All pertinent protocol data (lab date/result):    Include pertinent information from patients message:

## 2023-12-21 ENCOUNTER — TELEPHONE (OUTPATIENT)
Dept: NEUROLOGY | Facility: CLINIC | Age: 73
End: 2023-12-21
Payer: MEDICARE

## 2023-12-21 DIAGNOSIS — I95.1 ORTHOSTATIC HYPOTENSION: Primary | ICD-10-CM

## 2023-12-21 RX ORDER — PYRIDOSTIGMINE BROMIDE 60 MG/1
60 TABLET ORAL 3 TIMES DAILY
Qty: 90 TABLET | Refills: 4 | Status: SHIPPED | OUTPATIENT
Start: 2023-12-21 | End: 2024-01-26

## 2023-12-21 NOTE — TELEPHONE ENCOUNTER
LVM - pt should have enough refills at pharmacy.   Advised to call back if further questions or concerns.

## 2023-12-21 NOTE — TELEPHONE ENCOUNTER
[facility] Call Center    Phone Message    May a detailed message be left on voicemail: yes     Reason for Call: Medication Refill Request    Has the patient contacted the pharmacy for the refill? Yes   Name of medication being requested: pyRIDostigmine (MESTINON) 60 MG tablet [[id number]] (Order [id number])      Provider who prescribed the medication: Elva Segundo MD  Pharmacy: [facility] Address: [address] [place] [address], [place] [phone]  Date medication is needed: 12/21/23   Please call Pat @ [phone] to discuss further.    Action Taken: Message routed to:  [facility] Neurology    Travel Screening: Not Applicable

## 2024-01-25 NOTE — PROGRESS NOTES
"The patient is being evaluated via a billable video visit.    How would you like to obtain your AVS? MyChart  If the video visit is dropped, the invitation should be resent by: Send to e-mail at: [email or web address]  Will anyone else be joining your video visit? No      Video-Visit Details  Type of service:  Video Visit  Video Start Time:3:12 PM  Video End Time:3:37 PM  Originating Location (pt. 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 On exam she has downbeat nystagmus that can be elicited with head extension in all positions (right, center, left) which is associated with her vertigo. She has severely impaired smooth pursuit but no gaze-evoked nystagmus. This pattern is not typical of BPPV but is concerning for a central process, like a cerebellar degeneration or a lesion in the posterior fossa.      Because of her pace-maker, we started with a CT venogram so that we can see the vasculature at the skull base, which can be compressed with head extension. There is also a mild left sided dysmetria which also points to a cerebellar lesion. There is a flavor of syndromic-ness to her picture given the severe hearing loss, white matter disease, bradycardia, and now the central eye movement abnormality and ataxia.       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 Severe narrowing of the left internal jugular vein between the styloid process of the lateral edge of C1 is present on both neutral and extension positions.\"     On my review there was also " "loss of volume at the cerebellar vermis out of proportion to the rest of the brain and the rest of the cerebellum. The volume loss was so severe that it almost looked like a cyst. There is also loss of volume of the right occipital lobe and bilateral parietal lobes.      Interim History 8-26-22:  Trial of 4-AP, titrated to 10mg BID. She has just started on the goal dose. She has been on this for a little more than a month. When she gets up she feels lightheadedness. There is no vertigo. There is some sense of disorientation. There is no sense of oscillopsia. The trigger can be noise, standing up from sitting, head extension. If she is not moving her head, she feels okay. Her symptoms are only triggered by head movement. All direction of head movement are equally as bad. They are triggered only when she is standing up. She is fine moving her head when sitting or lying down. There is no headache or head pressure. No ear pain or ear pressure. There is sometimes ringing in the ears. There is no neck pain or tightness. There is no chest pain. No pelvic or abdominal pressure. There is no family history of similar problems.      She is not having any falls. She feels like she is \"running\" when she walks because her feet go faster than she wants them to. She does not endorse freezing of gait. She tends to walk in a zig-zag way. She walks like she is drunk.      She has mild dysarthria. Patient denies any visual problems.   Paraneoplastic panel, Anti-KRYSTA, Vitamin E were all normal 6-13-22     Impression: Potentially a syndrome of cerebellar, biparietal atrophy, white matter disease, hearing loss, bradycardia, with additional component of left internal jugular compression at the skull base. Before considering more tests for ataxia, I'd like to obtain  from my colleague Jc Araya, an ataxia specialist, so that we can proceed more efficiently. In the meantime, she should continue with the 4-AP as she has just " reached the goal dose.      Plan:   1. Continue with the 4-AP 10mg BID  2. Referral to Jc Araya, Ataxia Clinic regarding evaluation for genetic or other neurodegeneration disorders of ataxia given caudal cerebellum, parietal lobe, and occipital lobe atrophy.      INTERIM HISTORY 1/20/2023:  10-7-22: Appt Dr. Araya. Did not detect ataxia or downbeat nystagmus (notably, patient was on 4-AP at the time).  Added TPO antibodies which were elevated at 312 but patient has known hypothyroidism and is on levothyroxine. Transglutaminase, TSH and thyroglobulin antibodies were negative.      10-7-22: GENETIC COUNSELING  We discussed the genetic and environmental basis of neurologic disease. I explained that her later age of onset and lack of clear family history would argue against a single gene cause for her symptoms.     She has been stable overall. She was a little worse in December, listening to music and singing in [employer] and felt more vertigo. She had to go home and lie down. She is better in January. She notices that being in situations where the body is shaken can make her worse.      She is taking 4-AP 10mg twice a day. She feels like disorientation on this medication. She does not have an improvement in balance. She fell twice on the snow. She does not fall in regular places indoors. She can hit the wall when she walks. She feels that her speech is fine.  There is no tremor.      There is no headache or head pressure. There are no muscle pains.      Exam: very mild downbeat nystagmus only on lateral gaze. Speech is normal. Content normal.    Plan:  1. Stay on 4-AP for 1 year. Consider trial of commercial Ampyra if not noticing improvement  2. Autonomic function testing 2-3-23     Interim History 7/21/2023:  2-13-23 Autonomic testing [facility] [place]. 50mmHg orthostatic drop. Concern for central autonomic disorder like MSA. Note that patient has a pacemaker. She does not remember her dizziness being triggered during the  "test.      She has been having dizzy spells that have worsened over the last 8 days. She thinks that they are her previous spell, just worse. She feels that the worsening was suddenly worse with no particularly worse.      There is sense that the world is moving around her, all directions, very frequently triggered but not present 100% of the time.  It is worsened by body movement (getting up from bed, getting up from the couch). It gets better lying down, taking a nap. It is completely better lying down and staying still. If she turns her head lying down but turning her head, she she feels light headed and feeling like her head is, \"swimming.\" Her head feels like there is pressure, worse when she stands up.      She is taking the 4-AP, 10mg BID. Getting it from [facility]. She didn't start a new bottle of pills when the symptoms worsened.      Exam:   Right now, sitting still, without moving the head, there's no vertigo. This could be triggered by standing up.      Turning to the right, left, extension, flexion can all worsen the feeling floating but they are all the same intensity of worsening.   The feeling is a swirling. There is no presyncope. It took less than a minute for the feeling to go away.      She stood up and felt the same as sitting. Whether sitting for 5-minutes or an hour would not make a difference.   She does feel that her balance is poor. She has been going to  for balance training. She has not been falling.   There is no chronic pain in the head, neck, or arms. She does have headache twice a week experienced as a frontal pain, slight, well managed with ibuprofen or acetaminophen.      Plan:   1. Patient to measure blood pressure lying, sitting, standing position and correlate with intensity of vertigo  2. If there is some room on the blood, trial of midodrine to raise the blood pressure to treat orthostatic hypotension  3. Continue with the 4-AP at 10mg BID  4. Broached the possibility of a " neurodegenerative process     Interim History 8/25/2023:  There is a sense of floating like the top of head is not connected to her body. There is still no vertigo.  This is triggered by standing up.      Turning to the right, left, extension, flexion can all worsen the feeling floating but they are all the same intensity of worsening.   The feeling is a swirling. There is no presyncope. It took less than a minute for the feeling to go away.      Still getting the 4-AP 10mg BID from [facility]. No side effects.   Patient sent blood pressure recordings. There is some correlation with her dizziness with standing and blood pressure drops.     We discussed different options of augmenting blood pressure including Mestinon, Florinef, and Midodrine. We'll start with the Mestinon as there as she may have a more neurogenic cause of her autonomic dysfunction.      PLAN:   Mestinon first 1/4 tablet of 60mg Q8hrs, titrated to 1/2 tablet to Q8hrs if tolerated.  2. Continue 4 AP for now  3. Follow-up 6 months    Interim History 1/26/2024:  The Mestinon has raised the blood pressure by about 10-15 mmHg to about 103 systolic. She still has to get up slowing. Not any easier than before.    The 4-AP is still 10mg BID. She has been on this for over a year and has not noticed any improved.    She feels fine sitting. She is going to [facility], She has been given some ocular exercises to do. Dizziness can be triggered by movement of her eyes. There is also a sense of faintness when she stands up, lasting a couple minutes. Once she gets going, she's better. When she is sitting and moving her head, she can trigger the floaty feeling. Looking left is worse than looking right. There is no spinning vertigo or rocking vertigo. She has tried wearing compression stockings.     Plan:  Continue mestinon 25mg TID  Taper 4-AP. 1 capsule in the morning for a week then stop  Trial of florinef 0.1mg after 4-AP  Trial of  NUCCA for the left IJV compression at C1  Follow-up 6 months     DATA:  I personally reviewed the following data.    Last brain imaging:  US Up Ex [facility] Thor Outlet Syn Bilat  Narrative: THORACIC INLET/OUTLET DUPLEX ULTRASOUND 5/12/2022 10:56 AM    CLINICAL HISTORY: 71F with forehead pressure and vertigo with head  extension.; Compression of vein; TOS (thoracic outlet syndrome).    COMPARISONS: None available.    REFERRING PROVIDER: GISELLE COKER CHA    TECHNIQUE: Bilateral innominate, subclavian, and axillary veins were  evaluated grayscale, color Doppler, Doppler waveform ultrasound.  Bilateral subclavian veins were evaluated with color Doppler and  Doppler waveform imaging through abduction maneuvers.    Bilateral index finger PPG's obtained at rest and with provocative  positions.    Bilateral internal jugular veins evaluated at rest with grayscale,  color Doppler, and Doppler waveform ultrasound. Bilateral internal  jugular veins evaluated with color Doppler and Doppler waveform  ultrasound through maneuvers.    FINDINGS:  RIGHT:       REST:            INTERNAL JUGULAR VEIN: 54 cm/s, phasic, fully compressible            INNOMINATE VEIN: 25 cm/s, phasic            SUBCLAVIAN VEIN, medial: 42 cm/s, phasic            SUBCLAVIAN VEIN, mid: 31 cm/s, phasic, fully compressible            SUBCLAVIAN VEIN, lateral: 73 cm/s, phasic, fully  compressible            AXILLARY VEIN: 42 cm/s, phasic, fully compressible         MID SUBCLAVIAN VEIN, sitting erect:            0 degrees: 91 cm/s, phasic            90 degrees: 166 cm/s, phasic            135 degrees: 236 cm/s, phasic            180 degrees: 209 cm/s, phasic         INTERNAL JUGULAR VEIN, sitting erect:            Neutral: 145 cm/s, phasic            Right: 168 cm/s, phasic            Left: 166 cm/s, phasic            Extension: 154 cm/s, phasic            Flexion: 126 cm/s, phasic         PPGs:            Baseline: Normal            Arms 90: Normal             Arms 180: Severely dampened, flattened              : Normal             head right: Normal             head left: Normal            Onset: Normal    LEFT:       REST:            INTERNAL JUGULAR VEIN: 99 cm/s, phasic, fully compressible            INNOMINATE VEIN: 32 cm/s, phasic            SUBCLAVIAN VEIN, medial: 79 cm/s, phasic            SUBCLAVIAN VEIN, mid: 56 cm/s, phasic, fully compressible            SUBCLAVIAN VEIN, lateral: 36 cm/s, phasic, fully  compressible            AXILLARY VEIN: 53 cm/s, phasic, fully compressible         MID SUBCLAVIAN VEIN, sitting erect:            0 degrees: 28 cm/s, phasic            90 degrees: 124 cm/s, phasic,             135 degrees: 137 cm/s, phasic            180 degrees: 228 cm/s, phasic         INTERNAL JUGULAR VEIN, sitting erect:            Neutral: 97 cm/s, phasic            Right: 190 cm/s, phasic            Left: 75 cm/s, phasic            Extension: 92 cm/s, phasic            Flexion: 81 cm/s, phasic        PPGs:            Baseline: Normal            Arms 90: Normal            Arms 180: Severely dampened, flattened            : Normal             head right: Normal             head left: Normal            Onset: Normal  Impression: IMPRESSION: Thoracic outlet/inlet physiology suggested. Correlate for  symptoms.    1. RIGHT:       A. No subclavian venous stenosis suggested at rest.       B. increased velocities and turbulent flow in the subclavian vein  with maneuvers without cessation of flow.       C. Patent right internal jugular vein with increased velocities  during various maneuvers, without cessation of flow.       D. Loss of PPG waveform/severe dampening at 180 degrees arm  abduction, nonspecific. Remainder of maneuvers do not dampen PPG  waveforms.    2. LEFT:       A. No subclavian venous stenosis suggested at rest.       B. increased velocities and turbulent flow in the subclavian vein  with maneuvers  without cessation of flow.       C. Patent left internal jugular vein with increased velocities  during various maneuvers, without cessation of flow.       D. Loss of PPG waveform/severe dampening at 180 degrees arm  abduction, nonspecific. Remainder of maneuvers do not dampen PPG  waveforms.  .    I have personally reviewed the examination and initial interpretation  and I agree with the findings.    ELSA TAMAYO MD         SYSTEM ID:  [id number]  CTV Head w Contrast  Narrative: CTV HEAD NECK WITH CONTRAST 5/12/2022 9:22 AM    History:  71F with head extension induced vertigo with downbeat  nystagmus. Question cerebellar degeneration, venous stenosis, styloid  length,.; Compression of vein; Nystagmus; TOS (thoracic outlet  syndrome)  ICD-10: Compression of vein; Nystagmus; TOS (thoracic outlet syndrome)     Comparison: Brain MRI 9/15/2021, 5/1/2008      Technique: Helically acquired thin section axial CT images were  obtained with 1 mm collimation through the brain after intravenous  bolus injection of iodinated contrast medium with an approximately  20-25 second delay between administration of contrast and scanning.  Image data were sent to the [employer] 3D workstation and postprocessed by  the radiologist using maximum intensity pixel (MIP), multiplanar and  volume rendered 3D reconstruction programs.     Contrast: Isovue 370 75cc    Findings: Diminutive size of the left transverse, sigmoid, and  internal jugular vein relative to the right. The left internal jugular  vein then is markedly narrowed between the styloid process and  transverse process of C1 which also persists on the extension images.  No intraluminal filling defects visualized intracranially or  extracranially. Bilateral subclavian and axillary veins are widely  patent without evidence for stenosis with the arms down.    There is no mass effect, midline shift, or  intracranial hemorrhage.   The ventricles are proportionate to the cerebral sulci.  The gray-white  matter differentiation of the cerebral hemispheres is preserved.  Postcontrast images demonstrate no abnormal intracranial parenchymal  or meningeal enhancement. Moderate diffuse cerebral atrophy. Unchanged  asymmetric volume loss in the right parasagittal occipital lobe.     The orbits, visualized portions of paranasal sinuses, and mastoid air  cells are relatively clear.     Scattered multilevel cervical spondylosis without significant spinal  canal or neuroforaminal narrowing. No suspicious sclerotic or lucent  osseous lesion.    Cervical soft tissues are unremarkable. Thyroid is unremarkable.    Partially visualized left chest wall ICD/Pacemaker.  Impression: Impression:    1. 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 Significance is uncertain given  the small size of this vein inferiorly. No intraluminal thrombus or  significant extraluminal stenosis in either the intracranial or  extracranial veins.  2. No CT evidence for thoracic outlet syndrome.  3. Moderate diffuse cerebral volume loss, likely sequelae of chronic  small vessel ischemic disease.    I have personally reviewed the examination and initial interpretation  and I agree with the findings.    ROCÍO TORRES MD         SYSTEM ID:  [id number]      25-minutes were spent in evaluation, counseling, and documentation on the date of service.

## 2024-01-26 ENCOUNTER — TELEPHONE (OUTPATIENT)
Dept: NEUROLOGY | Facility: CLINIC | Age: 74
End: 2024-01-26

## 2024-01-26 ENCOUNTER — VIRTUAL VISIT (OUTPATIENT)
Dept: NEUROLOGY | Facility: CLINIC | Age: 74
End: 2024-01-26
Payer: MEDICARE

## 2024-01-26 VITALS — HEIGHT: 63 IN | WEIGHT: 155 LBS | BODY MASS INDEX: 27.46 KG/M2

## 2024-01-26 DIAGNOSIS — I95.1 ORTHOSTATIC HYPOTENSION: ICD-10-CM

## 2024-01-26 DIAGNOSIS — H55.09 DOWNBEAT NYSTAGMUS: Primary | ICD-10-CM

## 2024-01-26 DIAGNOSIS — R27.0 ATAXIA: ICD-10-CM

## 2024-01-26 DIAGNOSIS — I87.1 COMPRESSION OF VEIN: ICD-10-CM

## 2024-01-26 PROCEDURE — 99213 OFFICE O/P EST LOW 20 MIN: CPT | Mod: 95 | Performed by: PSYCHIATRY & NEUROLOGY

## 2024-01-26 RX ORDER — PYRIDOSTIGMINE BROMIDE 60 MG/1
60 TABLET ORAL 3 TIMES DAILY
Qty: 270 TABLET | Refills: 3 | Status: SHIPPED | OUTPATIENT
Start: 2024-01-26 | End: 2024-07-26

## 2024-01-26 RX ORDER — PYRIDOSTIGMINE BROMIDE 60 MG/1
60 TABLET ORAL 3 TIMES DAILY
Qty: 93 TABLET | Refills: 11 | Status: SHIPPED | OUTPATIENT
Start: 2024-01-26 | End: 2024-01-26

## 2024-01-26 RX ORDER — FLUDROCORTISONE ACETATE 0.1 MG/1
0.1 TABLET ORAL DAILY
Qty: 31 TABLET | Refills: 4 | Status: SHIPPED | OUTPATIENT
Start: 2024-01-26 | End: 2024-07-26

## 2024-01-26 ASSESSMENT — PAIN SCALES - GENERAL: PAINLEVEL: NO PAIN (0)

## 2024-01-26 NOTE — NURSING NOTE
Is the patient currently in the state of [place]? YES    Visit mode:VIDEO    If the visit is dropped, the patient can be reconnected by: VIDEO VISIT: Text to cell phone:   Telephone Information:   Mobile [phone]       Will anyone else be joining the visit? NO  (If patient encounters technical issues they should call [phone]    How would you like to obtain your AVS? MyChart    Are changes needed to the allergy or medication list? No    Reason for visit: Follow Up    Mary WATERMAN

## 2024-01-26 NOTE — TELEPHONE ENCOUNTER
Left Voicemail (1st Attempt) and Sent Mychart (1st Attempt) for the patient to call back and schedule the following:    Appointment type: Return Neurology (video)  Provider: Ratna  Return date: Around 7/26/2024  Specialty phone number: [phone]  Additional appointment(s) needed: N/A  Additional Notes: N/A    Kalia cOhoa on 1/26/2024 at 4:26 PM

## 2024-01-26 NOTE — LETTER
"1/26/2024       RE: Pat Barclay  [address]  [place] [address]     Dear Colleague,    Thank you for referring your patient, Pat Barclay, to the [facility] [place] at [facility]. Please see a copy of my visit note below.    The patient is being evaluated via a billable video visit.    How would you like to obtain your AVS? MyChart  If the video visit is dropped, the invitation should be resent by: Send to e-mail at: [email or web address]  Will anyone else be joining your video visit? 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 On exam she has downbeat nystagmus that can be elicited with head extension in all positions (right, center, left) which is associated with her vertigo. She has severely impaired smooth pursuit but no gaze-evoked nystagmus. This pattern is not typical of BPPV but is concerning for a central process, like a cerebellar degeneration or a lesion in the posterior fossa.      Because of her pace-maker, we started with a CT venogram so that we can see the vasculature at the skull base, which can be compressed with head extension. There is also a mild left sided dysmetria which also points to a cerebellar lesion. There is a flavor of syndromic-ness to her picture given the severe hearing loss, white matter disease, bradycardia, and now the central eye movement abnormality and ataxia.       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 Severe narrowing of the left internal jugular vein between the styloid process of the lateral edge of C1 is present on both neutral and extension positions.\"   " "  On my review there was also loss of volume at the cerebellar vermis out of proportion to the rest of the brain and the rest of the cerebellum. The volume loss was so severe that it almost looked like a cyst. There is also loss of volume of the right occipital lobe and bilateral parietal lobes.      Interim History 8-26-22:  Trial of 4-AP, titrated to 10mg BID. She has just started on the goal dose. She has been on this for a little more than a month. When she gets up she feels lightheadedness. There is no vertigo. There is some sense of disorientation. There is no sense of oscillopsia. The trigger can be noise, standing up from sitting, head extension. If she is not moving her head, she feels okay. Her symptoms are only triggered by head movement. All direction of head movement are equally as bad. They are triggered only when she is standing up. She is fine moving her head when sitting or lying down. There is no headache or head pressure. No ear pain or ear pressure. There is sometimes ringing in the ears. There is no neck pain or tightness. There is no chest pain. No pelvic or abdominal pressure. There is no family history of similar problems.      She is not having any falls. She feels like she is \"running\" when she walks because her feet go faster than she wants them to. She does not endorse freezing of gait. She tends to walk in a zig-zag way. She walks like she is drunk.      She has mild dysarthria. Patient denies any visual problems.   Paraneoplastic panel, Anti-KRYSTA, Vitamin E were all normal 6-13-22     Impression: Potentially a syndrome of cerebellar, biparietal atrophy, white matter disease, hearing loss, bradycardia, with additional component of left internal jugular compression at the skull base. Before considering more tests for ataxia, I'd like to obtain  from my colleague Jc Araya, an ataxia specialist, so that we can proceed more efficiently. In the meantime, she should continue with " the 4-AP as she has just reached the goal dose.      Plan:   1. Continue with the 4-AP 10mg BID  2. Referral to Jc Araya, Ataxia Clinic regarding evaluation for genetic or other neurodegeneration disorders of ataxia given caudal cerebellum, parietal lobe, and occipital lobe atrophy.      INTERIM HISTORY 1/20/2023:  10-7-22: Appt Dr. Araya. Did not detect ataxia or downbeat nystagmus (notably, patient was on 4-AP at the time).  Added TPO antibodies which were elevated at 312 but patient has known hypothyroidism and is on levothyroxine. Transglutaminase, TSH and thyroglobulin antibodies were negative.      10-7-22: GENETIC COUNSELING  We discussed the genetic and environmental basis of neurologic disease. I explained that her later age of onset and lack of clear family history would argue against a single gene cause for her symptoms.     She has been stable overall. She was a little worse in December, listening to music and singing in [employer] and felt more vertigo. She had to go home and lie down. She is better in January. She notices that being in situations where the body is shaken can make her worse.      She is taking 4-AP 10mg twice a day. She feels like disorientation on this medication. She does not have an improvement in balance. She fell twice on the snow. She does not fall in regular places indoors. She can hit the wall when she walks. She feels that her speech is fine.  There is no tremor.      There is no headache or head pressure. There are no muscle pains.      Exam: very mild downbeat nystagmus only on lateral gaze. Speech is normal. Content normal.    Plan:  1. Stay on 4-AP for 1 year. Consider trial of commercial Ampyra if not noticing improvement  2. Autonomic function testing 2-3-23     Interim History 7/21/2023:  2-13-23 Autonomic testing [facility]. 50mmHg orthostatic drop. Concern for central autonomic disorder like MSA. Note that patient has a pacemaker. She does not remember her dizziness  "being triggered during the test.      She has been having dizzy spells that have worsened over the last 8 days. She thinks that they are her previous spell, just worse. She feels that the worsening was suddenly worse with no particularly worse.      There is sense that the world is moving around her, all directions, very frequently triggered but not present 100% of the time.  It is worsened by body movement (getting up from bed, getting up from the couch). It gets better lying down, taking a nap. It is completely better lying down and staying still. If she turns her head lying down but turning her head, she she feels light headed and feeling like her head is, \"swimming.\" Her head feels like there is pressure, worse when she stands up.      She is taking the 4-AP, 10mg BID. Getting it from [facility]. She didn't start a new bottle of pills when the symptoms worsened.      Exam:   Right now, sitting still, without moving the head, there's no vertigo. This could be triggered by standing up.      Turning to the right, left, extension, flexion can all worsen the feeling floating but they are all the same intensity of worsening.   The feeling is a swirling. There is no presyncope. It took less than a minute for the feeling to go away.      She stood up and felt the same as sitting. Whether sitting for 5-minutes or an hour would not make a difference.   She does feel that her balance is poor. She has been going to  for balance training. She has not been falling.   There is no chronic pain in the head, neck, or arms. She does have headache twice a week experienced as a frontal pain, slight, well managed with ibuprofen or acetaminophen.      Plan:   1. Patient to measure blood pressure lying, sitting, standing position and correlate with intensity of vertigo  2. If there is some room on the blood, trial of midodrine to raise the blood pressure to treat orthostatic hypotension  3. Continue with the 4-AP at 10mg BID  4. " Broached the possibility of a neurodegenerative process     Interim History 8/25/2023:  There is a sense of floating like the top of head is not connected to her body. There is still no vertigo.  This is triggered by standing up.      Turning to the right, left, extension, flexion can all worsen the feeling floating but they are all the same intensity of worsening.   The feeling is a swirling. There is no presyncope. It took less than a minute for the feeling to go away.      Still getting the 4-AP 10mg BID from [facility]. No side effects.   Patient sent blood pressure recordings. There is some correlation with her dizziness with standing and blood pressure drops.     We discussed different options of augmenting blood pressure including Mestinon, Florinef, and Midodrine. We'll start with the Mestinon as there as she may have a more neurogenic cause of her autonomic dysfunction.      PLAN:   Mestinon first 1/4 tablet of 60mg Q8hrs, titrated to 1/2 tablet to Q8hrs if tolerated.  2. Continue 4 AP for now  3. Follow-up 6 months    Interim History 1/26/2024:  The Mestinon has raised the blood pressure by about 10-15 mmHg to about 103 systolic. She still has to get up slowing. Not any easier than before.    The 4-AP is still 10mg BID. She has been on this for over a year and has not noticed any improved.    She feels fine sitting. She is going to [facility], She has been given some ocular exercises to do. Dizziness can be triggered by movement of her eyes. There is also a sense of faintness when she stands up, lasting a couple minutes. Once she gets going, she's better. When she is sitting and moving her head, she can trigger the floaty feeling. Looking left is worse than looking right. There is no spinning vertigo or rocking vertigo. She has tried wearing compression stockings.     Plan:  Continue mestinon 25mg TID  Taper 4-AP. 1 capsule in the morning for a week then stop  Trial of florinef  0.1mg after 4-AP  Trial of NUCCA for the left IJV compression at C1  Follow-up 6 months     DATA:  I personally reviewed the following data.    Last brain imaging:  US Up Ex [facility] Thor Outlet Syn Bilat  Narrative: THORACIC INLET/OUTLET DUPLEX ULTRASOUND 5/12/2022 10:56 AM    CLINICAL HISTORY: 71F with forehead pressure and vertigo with head  extension.; Compression of vein; TOS (thoracic outlet syndrome).    COMPARISONS: None available.    REFERRING PROVIDER: GISELLE COKER CHA    TECHNIQUE: Bilateral innominate, subclavian, and axillary veins were  evaluated grayscale, color Doppler, Doppler waveform ultrasound.  Bilateral subclavian veins were evaluated with color Doppler and  Doppler waveform imaging through abduction maneuvers.    Bilateral index finger PPG's obtained at rest and with provocative  positions.    Bilateral internal jugular veins evaluated at rest with grayscale,  color Doppler, and Doppler waveform ultrasound. Bilateral internal  jugular veins evaluated with color Doppler and Doppler waveform  ultrasound through maneuvers.    FINDINGS:  RIGHT:       REST:            INTERNAL JUGULAR VEIN: 54 cm/s, phasic, fully compressible            INNOMINATE VEIN: 25 cm/s, phasic            SUBCLAVIAN VEIN, medial: 42 cm/s, phasic            SUBCLAVIAN VEIN, mid: 31 cm/s, phasic, fully compressible            SUBCLAVIAN VEIN, lateral: 73 cm/s, phasic, fully  compressible            AXILLARY VEIN: 42 cm/s, phasic, fully compressible         MID SUBCLAVIAN VEIN, sitting erect:            0 degrees: 91 cm/s, phasic            90 degrees: 166 cm/s, phasic            135 degrees: 236 cm/s, phasic            180 degrees: 209 cm/s, phasic         INTERNAL JUGULAR VEIN, sitting erect:            Neutral: 145 cm/s, phasic            Right: 168 cm/s, phasic            Left: 166 cm/s, phasic            Extension: 154 cm/s, phasic            Flexion: 126 cm/s, phasic         PPGs:            Baseline: Normal             Arms 90: Normal            Arms 180: Severely dampened, flattened              : Normal             head right: Normal             head left: Normal            Onset: Normal    LEFT:       REST:            INTERNAL JUGULAR VEIN: 99 cm/s, phasic, fully compressible            INNOMINATE VEIN: 32 cm/s, phasic            SUBCLAVIAN VEIN, medial: 79 cm/s, phasic            SUBCLAVIAN VEIN, mid: 56 cm/s, phasic, fully compressible            SUBCLAVIAN VEIN, lateral: 36 cm/s, phasic, fully  compressible            AXILLARY VEIN: 53 cm/s, phasic, fully compressible         MID SUBCLAVIAN VEIN, sitting erect:            0 degrees: 28 cm/s, phasic            90 degrees: 124 cm/s, phasic,             135 degrees: 137 cm/s, phasic            180 degrees: 228 cm/s, phasic         INTERNAL JUGULAR VEIN, sitting erect:            Neutral: 97 cm/s, phasic            Right: 190 cm/s, phasic            Left: 75 cm/s, phasic            Extension: 92 cm/s, phasic            Flexion: 81 cm/s, phasic        PPGs:            Baseline: Normal            Arms 90: Normal            Arms 180: Severely dampened, flattened            : Normal             head right: Normal             head left: Normal            Onset: Normal  Impression: IMPRESSION: Thoracic outlet/inlet physiology suggested. Correlate for  symptoms.    1. RIGHT:       A. No subclavian venous stenosis suggested at rest.       B. increased velocities and turbulent flow in the subclavian vein  with maneuvers without cessation of flow.       C. Patent right internal jugular vein with increased velocities  during various maneuvers, without cessation of flow.       D. Loss of PPG waveform/severe dampening at 180 degrees arm  abduction, nonspecific. Remainder of maneuvers do not dampen PPG  waveforms.    2. LEFT:       A. No subclavian venous stenosis suggested at rest.       B. increased velocities and turbulent flow in the  subclavian vein  with maneuvers without cessation of flow.       C. Patent left internal jugular vein with increased velocities  during various maneuvers, without cessation of flow.       D. Loss of PPG waveform/severe dampening at 180 degrees arm  abduction, nonspecific. Remainder of maneuvers do not dampen PPG  waveforms.  .    I have personally reviewed the examination and initial interpretation  and I agree with the findings.    ELSA TAMAYO MD         SYSTEM ID:  [id number]  CTV Head w Contrast  Narrative: CTV HEAD NECK WITH CONTRAST 5/12/2022 9:22 AM    History:  71F with head extension induced vertigo with downbeat  nystagmus. Question cerebellar degeneration, venous stenosis, styloid  length,.; Compression of vein; Nystagmus; TOS (thoracic outlet  syndrome)  ICD-10: Compression of vein; Nystagmus; TOS (thoracic outlet syndrome)     Comparison: Brain MRI 9/15/2021, 5/1/2008      Technique: Helically acquired thin section axial CT images were  obtained with 1 mm collimation through the brain after intravenous  bolus injection of iodinated contrast medium with an approximately  20-25 second delay between administration of contrast and scanning.  Image data were sent to the [employer] 3D workstation and postprocessed by  the radiologist using maximum intensity pixel (MIP), multiplanar and  volume rendered 3D reconstruction programs.     Contrast: Isovue 370 75cc    Findings: Diminutive size of the left transverse, sigmoid, and  internal jugular vein relative to the right. The left internal jugular  vein then is markedly narrowed between the styloid process and  transverse process of C1 which also persists on the extension images.  No intraluminal filling defects visualized intracranially or  extracranially. Bilateral subclavian and axillary veins are widely  patent without evidence for stenosis with the arms down.    There is no mass effect, midline shift, or  intracranial hemorrhage.   The ventricles are  proportionate to the cerebral sulci. The gray-white  matter differentiation of the cerebral hemispheres is preserved.  Postcontrast images demonstrate no abnormal intracranial parenchymal  or meningeal enhancement. Moderate diffuse cerebral atrophy. Unchanged  asymmetric volume loss in the right parasagittal occipital lobe.     The orbits, visualized portions of paranasal sinuses, and mastoid air  cells are relatively clear.     Scattered multilevel cervical spondylosis without significant spinal  canal or neuroforaminal narrowing. No suspicious sclerotic or lucent  osseous lesion.    Cervical soft tissues are unremarkable. Thyroid is unremarkable.    Partially visualized left chest wall ICD/Pacemaker.  Impression: Impression:    1. 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 Significance is uncertain given  the small size of this vein inferiorly. No intraluminal thrombus or  significant extraluminal stenosis in either the intracranial or  extracranial veins.  2. No CT evidence for thoracic outlet syndrome.  3. Moderate diffuse cerebral volume loss, likely sequelae of chronic  small vessel ischemic disease.    I have personally reviewed the examination and initial interpretation  and I agree with the findings.    ROCÍO TORRES MD         SYSTEM ID:  [id number]      25-minutes were spent in evaluation, counseling, and documentation on the date of service.        Again, thank you for allowing me to participate in the care of your patient.      Sincerely,    Elva PEDROZA Cha, MD

## 2024-01-29 ENCOUNTER — TELEPHONE (OUTPATIENT)
Dept: NEUROLOGY | Facility: CLINIC | Age: 74
End: 2024-01-29
Payer: MEDICARE

## 2024-03-01 ENCOUNTER — TELEPHONE (OUTPATIENT)
Dept: NEUROLOGY | Facility: CLINIC | Age: 74
End: 2024-03-01
Payer: MEDICARE

## 2024-03-01 NOTE — TELEPHONE ENCOUNTER
[facility] Call Center    Phone Message    May a detailed message be left on voicemail: yes     Reason for Call: Pat calling to request a call back due to being past the 31 day maría of taking fludrocortisone (FLORINEF) 0.1 MG tablet. Pat stated that she took the last tablet around 2/25/24 and is inquiring about what she is taking this medication for, does she need to continue and request a refill and does she need a blood test, per what the pharmacy informed her. Please call Pat to discuss at your earliest convenience.    Action Taken: Message routed to:  [facility]    Travel Screening: Not Applicable

## 2024-07-26 ENCOUNTER — VIRTUAL VISIT (OUTPATIENT)
Dept: NEUROLOGY | Facility: CLINIC | Age: 74
End: 2024-07-26
Payer: MEDICARE

## 2024-07-26 DIAGNOSIS — G31.9 CEREBELLAR ATROPHY (H): ICD-10-CM

## 2024-07-26 DIAGNOSIS — R27.0 ATAXIA: ICD-10-CM

## 2024-07-26 DIAGNOSIS — H55.09 DOWNBEAT NYSTAGMUS: Primary | ICD-10-CM

## 2024-07-26 DIAGNOSIS — R10.13 ABDOMINAL PAIN, EPIGASTRIC: ICD-10-CM

## 2024-07-26 PROCEDURE — 99214 OFFICE O/P EST MOD 30 MIN: CPT | Mod: 95 | Performed by: PSYCHIATRY & NEUROLOGY

## 2024-07-26 RX ORDER — SUCRALFATE 1 G/1
1 TABLET ORAL
COMMUNITY
Start: 2024-07-03 | End: 2025-07-03

## 2024-07-26 ASSESSMENT — PAIN SCALES - GENERAL: PAINLEVEL: MODERATE PAIN (4)

## 2024-07-26 NOTE — PROGRESS NOTES
"The patient is being evaluated via a billable video visit.    How would you like to obtain your AVS? MyChart  If the video visit is dropped, the invitation should be resent by: Send to e-mail at: [email or web address]  Will anyone else be joining your video visit? No      Video-Visit Details  Type of service:  Video Visit  Video Start Time:3:35 PM  Video End Time:4:00 PM  Originating Location (pt. Location): Home  Distant Location (provider location):  [facility]   Platform used for Video Visit: [facility]    Follow-up 5-27-22, 8-26-22, 7-21-23, 8-25-23, 1-26-24:  Pat Barclay is a 74 year old female with a past medical history of bradycardia s/p pacemaker, progressive cerebral atrophy and white matter disease, bilateral hearing loss, who presents with dizziness and imbalance after neck extension. On exam she has downbeat nystagmus that can be elicited with head extension in all positions (right, center, left) which is associated with her vertigo. She has severely impaired smooth pursuit but no gaze-evoked nystagmus. This pattern is not typical of BPPV but is concerning for a central process, like a cerebellar degeneration or a lesion in the posterior fossa.      Because of her pace-maker, we started with a CT venogram so that we can see the vasculature at the skull base, which can be compressed with head extension. There is also a mild left sided dysmetria which also points to a cerebellar lesion. There is a flavor of syndromic-ness to her picture given the severe hearing loss, white matter disease, bradycardia, and now the central eye movement abnormality and ataxia.       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 Severe narrowing of the left internal jugular vein between the styloid process of the lateral edge of C1 is present on both neutral and extension positions.\"     On my review there was " "also loss of volume at the cerebellar vermis out of proportion to the rest of the brain and the rest of the cerebellum. The volume loss was so severe that it almost looked like a cyst. There is also loss of volume of the right occipital lobe and bilateral parietal lobes.      Interim History 8-26-22:  Trial of 4-AP, titrated to 10mg BID. She has just started on the goal dose. She has been on this for a little more than a month. When she gets up she feels lightheadedness. There is no vertigo. There is some sense of disorientation. There is no sense of oscillopsia. The trigger can be noise, standing up from sitting, head extension. If she is not moving her head, she feels okay. Her symptoms are only triggered by head movement. All direction of head movement are equally as bad. They are triggered only when she is standing up. She is fine moving her head when sitting or lying down. There is no headache or head pressure. No ear pain or ear pressure. There is sometimes ringing in the ears. There is no neck pain or tightness. There is no chest pain. No pelvic or abdominal pressure. There is no family history of similar problems.      She is not having any falls. She feels like she is \"running\" when she walks because her feet go faster than she wants them to. She does not endorse freezing of gait. She tends to walk in a zig-zag way. She walks like she is drunk.      She has mild dysarthria. Patient denies any visual problems.   Paraneoplastic panel, Anti-KRYSTA, Vitamin E were all normal 6-13-22     Impression: Potentially a syndrome of cerebellar, biparietal atrophy, white matter disease, hearing loss, bradycardia, with additional component of left internal jugular compression at the skull base. Before considering more tests for ataxia, I'd like to obtain  from my colleague Jc Araya, an ataxia specialist, so that we can proceed more efficiently. In the meantime, she should continue with the 4-AP as she has just " reached the goal dose.      Plan:   1. Continue with the 4-AP 10mg BID  2. Referral to Jc Araya, [facility] regarding evaluation for genetic or other neurodegeneration disorders of ataxia given caudal cerebellum, parietal lobe, and occipital lobe atrophy.      INTERIM HISTORY 1/20/2023:  10-7-22: Appt Dr. Araya. Did not detect ataxia or downbeat nystagmus (notably, patient was on 4-AP at the time).  Added TPO antibodies which were elevated at 312 but patient has known hypothyroidism and is on levothyroxine. Transglutaminase, TSH and thyroglobulin antibodies were negative.      10-7-22: GENETIC COUNSELING  We discussed the genetic and environmental basis of neurologic disease. I explained that her later age of onset and lack of clear family history would argue against a single gene cause for her symptoms.     She has been stable overall. She was a little worse in December, listening to music and singing in [place] and felt more vertigo. She had to go home and lie down. She is better in January. She notices that being in situations where the body is shaken can make her worse.      She is taking 4-AP 10mg twice a day. She feels like disorientation on this medication. She does not have an improvement in balance. She fell twice on the snow. She does not fall in regular places indoors. She can hit the wall when she walks. She feels that her speech is fine.  There is no tremor.      There is no headache or head pressure. There are no muscle pains.      Exam: very mild downbeat nystagmus only on lateral gaze. Speech is normal. Content normal.     Plan:  1. Stay on 4-AP for 1 year. Consider trial of commercial Ampyra if not noticing improvement  2. Autonomic function testing 2-3-23     Interim History 7/21/2023:  2-13-23 Autonomic testing [facility]. 50mmHg orthostatic drop. Concern for central autonomic disorder like MSA. Note that patient has a pacemaker. She does not remember her dizziness being triggered during the  "test.      She has been having dizzy spells that have worsened over the last 8 days. She thinks that they are her previous spell, just worse. She feels that the worsening was suddenly worse with no particularly worse.      There is sense that the world is moving around her, all directions, very frequently triggered but not present 100% of the time.  It is worsened by body movement (getting up from bed, getting up from the couch). It gets better lying down, taking a nap. It is completely better lying down and staying still. If she turns her head lying down but turning her head, she she feels light headed and feeling like her head is, \"swimming.\" Her head feels like there is pressure, worse when she stands up.      She is taking the 4-AP, 10mg BID. Getting it from [facility]. She didn't start a new bottle of pills when the symptoms worsened.      Exam:   Right now, sitting still, without moving the head, there's no vertigo. This could be triggered by standing up.      Turning to the right, left, extension, flexion can all worsen the feeling floating but they are all the same intensity of worsening.   The feeling is a swirling. There is no presyncope. It took less than a minute for the feeling to go away.      She stood up and felt the same as sitting. Whether sitting for 5-minutes or an hour would not make a difference.   She does feel that her balance is poor. She has been going to  for balance training. She has not been falling.   There is no chronic pain in the head, neck, or arms. She does have headache twice a week experienced as a frontal pain, slight, well managed with ibuprofen or acetaminophen.      Plan:   1. Patient to measure blood pressure lying, sitting, standing position and correlate with intensity of vertigo  2. If there is some room on the blood, trial of midodrine to raise the blood pressure to treat orthostatic hypotension  3. Continue with the 4-AP at 10mg BID  4. Broached the possibility of a " neurodegenerative process     Interim History 8/25/2023:  There is a sense of floating like the top of head is not connected to her body. There is still no vertigo. This is triggered by standing up.      Turning to the right, left, extension, flexion can all worsen the feeling floating but they are all the same intensity of worsening.   The feeling is a swirling. There is no presyncope. It took less than a minute for the feeling to go away.      Still getting the 4-AP 10mg BID from [facility]. No side effects.   Patient sent blood pressure recordings. There is some correlation with her dizziness with standing and blood pressure drops.     We discussed different options of augmenting blood pressure including Mestinon, Florinef, and Midodrine. We'll start with the Mestinon as there as she may have a more neurogenic cause of her autonomic dysfunction.      PLAN:   Mestinon first 1/4 tablet of 60mg Q8hrs, titrated to 1/2 tablet to Q8hrs if tolerated.  2. Continue 4 AP for now  3. Follow-up 6 months     Interim History 1/26/2024:  The Mestinon has raised the blood pressure by about 10-15 mmHg to about 103 systolic. She still has to get up slowing. Not any easier than before.     The 4-AP is still 10mg BID. She has been on this for over a year and has not noticed any improvement.     She feels fine sitting. She is going to [facility], She has been given some ocular exercises to do. Dizziness can be triggered by movement of her eyes. There is also a sense of faintness when she stands up, lasting a couple minutes. Once she gets going, she's better. When she is sitting and moving her head, she can trigger the floaty feeling. Looking left is worse than looking right. There is no spinning vertigo or rocking vertigo. She has tried wearing compression stockings.      Plan:  Continue mestinon 25mg TID  Taper 4-AP. 1 capsule in the morning for a week then stop  Trial of florinef 0.1mg after 4-AP  Trial  of NUCCA for the left IJV compression at C1  Follow-up 6 months    Interim History 7/26/2024:  She could not go off of the 4-AP because her dizziness got a lot worse.   She is still on 10mg capsule twice a day. She is now more stable.   She stopped the Mestinon at least a couple months ago.  She never started the florinef.    She has has abdominal pain and bloating on July 3. This has not been better since then. This pain is 4-5/10. This seems to be triggered by eating, starting right away. The pain is more epigastric on both sides, about equal. She is taking sucralfate which has not helped.    7-15-24 CT ABDOMEN AND PELVIS WITH CONTRAST   IMPRESSION:   1.  Slight dilatation of the common bile duct which may be due to post cholecystectomy state.   2.  Unchanged diffuse prominence of the right hepatic lobe and diffuse fatty infiltration with no hepatic mass.   3.  No hydronephrosis.   4.  No splenomegaly.     She is seeing her PMD next week for further work-up of the abdominal pain.     She has dizziness spell 3-4 times a day. It can last from a few minutes to half a day. This is mostly a lightheaded feeling without nausea or spinning vertigo.  This is not triggered by any specific head movement. It can happen at any temperature and time of the day. She is not falling.     She saw Dr. Efrain Rossi at [facility]. She had adjustments every other week for 2 months.   She does feel that her head is more clear, more energy, and better balance.     PLAN:  Continue 4-AP 10mg BID  Maintenance NUCCA about once 6-weeks  Abdominal pain worsened with eating, under evaluation with PMD  Follow-up 6 months      Current Outpatient Medications:     sucralfate (CARAFATE) 1 GM tablet, Take 1 g by mouth, Disp: , Rfl:     cholecalciferol (VITAMIN D3) 25 mcg (1000 units) capsule, Take 1,000 Units by mouth daily, Disp: , Rfl:     estrogen conj-medroxyPROGESTERone (PREMPRO) 0.3-1.5 MG tablet, Take 1 tablet by mouth See Admin Instructions,  Disp: , Rfl:     fludrocortisone (FLORINEF) 0.1 MG tablet, Take 0.1 mg by mouth every morning, Disp: , Rfl:     levothyroxine (SYNTHROID/LEVOTHROID) 50 MCG tablet, Take 50 mcg by mouth daily, Disp: , Rfl:     NONFORMULARY, Take 10 mg by mouth 2 times daily, Disp: , Rfl:     SUDAFED SINUS CONGESTION 12HR 120 MG 12 hr tablet, Take 1 tablet by mouth 2 times daily as needed, Disp: , Rfl:     traZODone (DESYREL) 50 MG tablet, Take 1 tablet by mouth See Admin Instructions, Disp: , Rfl:     DATA:  I personally reviewed the following data.    Last brain imaging:  US Up Ex [facility] Thor Outlet Syn Bilat  Narrative: THORACIC INLET/OUTLET DUPLEX ULTRASOUND 5/12/2022 10:56 AM    CLINICAL HISTORY: 71F with forehead pressure and vertigo with head  extension.; Compression of vein; TOS (thoracic outlet syndrome).    COMPARISONS: None available.    REFERRING PROVIDER: GISELLE COKER CHA    TECHNIQUE: Bilateral innominate, subclavian, and axillary veins were  evaluated grayscale, color Doppler, Doppler waveform ultrasound.  Bilateral subclavian veins were evaluated with color Doppler and  Doppler waveform imaging through abduction maneuvers.    Bilateral index finger PPG's obtained at rest and with provocative  positions.    Bilateral internal jugular veins evaluated at rest with grayscale,  color Doppler, and Doppler waveform ultrasound. Bilateral internal  jugular veins evaluated with color Doppler and Doppler waveform  ultrasound through maneuvers.    FINDINGS:  RIGHT:       REST:            INTERNAL JUGULAR VEIN: 54 cm/s, phasic, fully compressible            INNOMINATE VEIN: 25 cm/s, phasic            SUBCLAVIAN VEIN, medial: 42 cm/s, phasic            SUBCLAVIAN VEIN, mid: 31 cm/s, phasic, fully compressible            SUBCLAVIAN VEIN, lateral: 73 cm/s, phasic, fully  compressible            AXILLARY VEIN: 42 cm/s, phasic, fully compressible         MID SUBCLAVIAN VEIN, sitting erect:            0 degrees: 91 cm/s, phasic             90 degrees: 166 cm/s, phasic            135 degrees: 236 cm/s, phasic            180 degrees: 209 cm/s, phasic         INTERNAL JUGULAR VEIN, sitting erect:            Neutral: 145 cm/s, phasic            Right: 168 cm/s, phasic            Left: 166 cm/s, phasic            Extension: 154 cm/s, phasic            Flexion: 126 cm/s, phasic         PPGs:            Baseline: Normal            Arms 90: Normal            Arms 180: Severely dampened, flattened              : Normal             head right: Normal             head left: Normal            Onset: Normal    LEFT:       REST:            INTERNAL JUGULAR VEIN: 99 cm/s, phasic, fully compressible            INNOMINATE VEIN: 32 cm/s, phasic            SUBCLAVIAN VEIN, medial: 79 cm/s, phasic            SUBCLAVIAN VEIN, mid: 56 cm/s, phasic, fully compressible            SUBCLAVIAN VEIN, lateral: 36 cm/s, phasic, fully  compressible            AXILLARY VEIN: 53 cm/s, phasic, fully compressible         MID SUBCLAVIAN VEIN, sitting erect:            0 degrees: 28 cm/s, phasic            90 degrees: 124 cm/s, phasic,             135 degrees: 137 cm/s, phasic            180 degrees: 228 cm/s, phasic         INTERNAL JUGULAR VEIN, sitting erect:            Neutral: 97 cm/s, phasic            Right: 190 cm/s, phasic            Left: 75 cm/s, phasic            Extension: 92 cm/s, phasic            Flexion: 81 cm/s, phasic        PPGs:            Baseline: Normal            Arms 90: Normal            Arms 180: Severely dampened, flattened            : Normal             head right: Normal             head left: Normal            Onset: Normal  Impression: IMPRESSION: Thoracic outlet/inlet physiology suggested. Correlate for  symptoms.    1. RIGHT:       A. No subclavian venous stenosis suggested at rest.       B. increased velocities and turbulent flow in the subclavian vein  with maneuvers without cessation of flow.        C. Patent right internal jugular vein with increased velocities  during various maneuvers, without cessation of flow.       D. Loss of PPG waveform/severe dampening at 180 degrees arm  abduction, nonspecific. Remainder of maneuvers do not dampen PPG  waveforms.    2. LEFT:       A. No subclavian venous stenosis suggested at rest.       B. increased velocities and turbulent flow in the subclavian vein  with maneuvers without cessation of flow.       C. Patent left internal jugular vein with increased velocities  during various maneuvers, without cessation of flow.       D. Loss of PPG waveform/severe dampening at 180 degrees arm  abduction, nonspecific. Remainder of maneuvers do not dampen PPG  waveforms.  .    I have personally reviewed the examination and initial interpretation  and I agree with the findings.    ELSA TAMAYO MD         SYSTEM ID:  [id number]  CTV Head w Contrast  Narrative: CTV HEAD NECK WITH CONTRAST 5/12/2022 9:22 AM    History:  71F with head extension induced vertigo with downbeat  nystagmus. Question cerebellar degeneration, venous stenosis, styloid  length,.; Compression of vein; Nystagmus; TOS (thoracic outlet  syndrome)  ICD-10: Compression of vein; Nystagmus; TOS (thoracic outlet syndrome)     Comparison: Brain MRI 9/15/2021, 5/1/2008      Technique: Helically acquired thin section axial CT images were  obtained with 1 mm collimation through the brain after intravenous  bolus injection of iodinated contrast medium with an approximately  20-25 second delay between administration of contrast and scanning.  Image data were sent to the [facility] 3D workstation and postprocessed by  the radiologist using maximum intensity pixel (MIP), multiplanar and  volume rendered 3D reconstruction programs.     Contrast: Isovue 370 75cc    Findings: Diminutive size of the left transverse, sigmoid, and  internal jugular vein relative to the right. The left internal jugular  vein then is markedly narrowed  between the styloid process and  transverse process of C1 which also persists on the extension images.  No intraluminal filling defects visualized intracranially or  extracranially. Bilateral subclavian and axillary veins are widely  patent without evidence for stenosis with the arms down.    There is no mass effect, midline shift, or  intracranial hemorrhage.   The ventricles are proportionate to the cerebral sulci. The gray-white  matter differentiation of the cerebral hemispheres is preserved.  Postcontrast images demonstrate no abnormal intracranial parenchymal  or meningeal enhancement. Moderate diffuse cerebral atrophy. Unchanged  asymmetric volume loss in the right parasagittal occipital lobe.     The orbits, visualized portions of paranasal sinuses, and mastoid air  cells are relatively clear.     Scattered multilevel cervical spondylosis without significant spinal  canal or neuroforaminal narrowing. No suspicious sclerotic or lucent  osseous lesion.    Cervical soft tissues are unremarkable. Thyroid is unremarkable.    Partially visualized left chest wall ICD/Pacemaker.  Impression: Impression:    1. 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 Significance is uncertain given  the small size of this vein inferiorly. No intraluminal thrombus or  significant extraluminal stenosis in either the intracranial or  extracranial veins.  2. No CT evidence for thoracic outlet syndrome.  3. Moderate diffuse cerebral volume loss, likely sequelae of chronic  small vessel ischemic disease.    I have personally reviewed the examination and initial interpretation  and I agree with the findings.    ROCÍO TORRES MD         SYSTEM ID:  [id number]    30-minutes were spent in evaluation, counseling, and documentation on the date of  service.

## 2024-07-26 NOTE — LETTER
"7/26/2024       RE: Pat Barclay  [address]  [place] [address]     Dear Colleague,    Thank you for referring your patient, Pat Barclay, to the [facility] [place] at [facility]. Please see a copy of my visit note below.    The patient is being evaluated via a billable video visit.    How would you like to obtain your AVS? MyChart  If the video visit is dropped, the invitation should be resent by: Send to e-mail at: [email or web address]  Will anyone else be joining your video visit? 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 On exam she has downbeat nystagmus that can be elicited with head extension in all positions (right, center, left) which is associated with her vertigo. She has severely impaired smooth pursuit but no gaze-evoked nystagmus. This pattern is not typical of BPPV but is concerning for a central process, like a cerebellar degeneration or a lesion in the posterior fossa.      Because of her pace-maker, we started with a CT venogram so that we can see the vasculature at the skull base, which can be compressed with head extension. There is also a mild left sided dysmetria which also points to a cerebellar lesion. There is a flavor of syndromic-ness to her picture given the severe hearing loss, white matter disease, bradycardia, and now the central eye movement abnormality and ataxia.       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 Severe narrowing of the left internal jugular vein between the styloid process of the lateral edge of C1 is present on both neutral and extension " "positions.\"     On my review there was also loss of volume at the cerebellar vermis out of proportion to the rest of the brain and the rest of the cerebellum. The volume loss was so severe that it almost looked like a cyst. There is also loss of volume of the right occipital lobe and bilateral parietal lobes.      Interim History 8-26-22:  Trial of 4-AP, titrated to 10mg BID. She has just started on the goal dose. She has been on this for a little more than a month. When she gets up she feels lightheadedness. There is no vertigo. There is some sense of disorientation. There is no sense of oscillopsia. The trigger can be noise, standing up from sitting, head extension. If she is not moving her head, she feels okay. Her symptoms are only triggered by head movement. All direction of head movement are equally as bad. They are triggered only when she is standing up. She is fine moving her head when sitting or lying down. There is no headache or head pressure. No ear pain or ear pressure. There is sometimes ringing in the ears. There is no neck pain or tightness. There is no chest pain. No pelvic or abdominal pressure. There is no family history of similar problems.      She is not having any falls. She feels like she is \"running\" when she walks because her feet go faster than she wants them to. She does not endorse freezing of gait. She tends to walk in a zig-zag way. She walks like she is drunk.      She has mild dysarthria. Patient denies any visual problems.   Paraneoplastic panel, Anti-KRYSTA, Vitamin E were all normal 6-13-22     Impression: Potentially a syndrome of cerebellar, biparietal atrophy, white matter disease, hearing loss, bradycardia, with additional component of left internal jugular compression at the skull base. Before considering more tests for ataxia, I'd like to obtain  from my colleague Jc Araya, an ataxia specialist, so that we can proceed more efficiently. In the meantime, she should " continue with the 4-AP as she has just reached the goal dose.      Plan:   1. Continue with the 4-AP 10mg BID  2. Referral to Jc Araya, Ataxia Clinic regarding evaluation for genetic or other neurodegeneration disorders of ataxia given caudal cerebellum, parietal lobe, and occipital lobe atrophy.      INTERIM HISTORY 1/20/2023:  10-7-22: Appt Dr. Araya. Did not detect ataxia or downbeat nystagmus (notably, patient was on 4-AP at the time).  Added TPO antibodies which were elevated at 312 but patient has known hypothyroidism and is on levothyroxine. Transglutaminase, TSH and thyroglobulin antibodies were negative.      10-7-22: GENETIC COUNSELING  We discussed the genetic and environmental basis of neurologic disease. I explained that her later age of onset and lack of clear family history would argue against a single gene cause for her symptoms.     She has been stable overall. She was a little worse in December, listening to music and singing in [employer] and felt more vertigo. She had to go home and lie down. She is better in January. She notices that being in situations where the body is shaken can make her worse.      She is taking 4-AP 10mg twice a day. She feels like disorientation on this medication. She does not have an improvement in balance. She fell twice on the snow. She does not fall in regular places indoors. She can hit the wall when she walks. She feels that her speech is fine.  There is no tremor.      There is no headache or head pressure. There are no muscle pains.      Exam: very mild downbeat nystagmus only on lateral gaze. Speech is normal. Content normal.     Plan:  1. Stay on 4-AP for 1 year. Consider trial of commercial Ampyra if not noticing improvement  2. Autonomic function testing 2-3-23     Interim History 7/21/2023:  2-13-23 Autonomic testing [facility]. 50mmHg orthostatic drop. Concern for central autonomic disorder like MSA. Note that patient has a pacemaker. She does not remember her  "dizziness being triggered during the test.      She has been having dizzy spells that have worsened over the last 8 days. She thinks that they are her previous spell, just worse. She feels that the worsening was suddenly worse with no particularly worse.      There is sense that the world is moving around her, all directions, very frequently triggered but not present 100% of the time.  It is worsened by body movement (getting up from bed, getting up from the couch). It gets better lying down, taking a nap. It is completely better lying down and staying still. If she turns her head lying down but turning her head, she she feels light headed and feeling like her head is, \"swimming.\" Her head feels like there is pressure, worse when she stands up.      She is taking the 4-AP, 10mg BID. Getting it from [facility]. She didn't start a new bottle of pills when the symptoms worsened.      Exam:   Right now, sitting still, without moving the head, there's no vertigo. This could be triggered by standing up.      Turning to the right, left, extension, flexion can all worsen the feeling floating but they are all the same intensity of worsening.   The feeling is a swirling. There is no presyncope. It took less than a minute for the feeling to go away.      She stood up and felt the same as sitting. Whether sitting for 5-minutes or an hour would not make a difference.   She does feel that her balance is poor. She has been going to  for balance training. She has not been falling.   There is no chronic pain in the head, neck, or arms. She does have headache twice a week experienced as a frontal pain, slight, well managed with ibuprofen or acetaminophen.      Plan:   1. Patient to measure blood pressure lying, sitting, standing position and correlate with intensity of vertigo  2. If there is some room on the blood, trial of midodrine to raise the blood pressure to treat orthostatic hypotension  3. Continue with the 4-AP at 10mg " BID  4. Broached the possibility of a neurodegenerative process     Interim History 8/25/2023:  There is a sense of floating like the top of head is not connected to her body. There is still no vertigo. This is triggered by standing up.      Turning to the right, left, extension, flexion can all worsen the feeling floating but they are all the same intensity of worsening.   The feeling is a swirling. There is no presyncope. It took less than a minute for the feeling to go away.      Still getting the 4-AP 10mg BID from [facility]. No side effects.   Patient sent blood pressure recordings. There is some correlation with her dizziness with standing and blood pressure drops.     We discussed different options of augmenting blood pressure including Mestinon, Florinef, and Midodrine. We'll start with the Mestinon as there as she may have a more neurogenic cause of her autonomic dysfunction.      PLAN:   Mestinon first 1/4 tablet of 60mg Q8hrs, titrated to 1/2 tablet to Q8hrs if tolerated.  2. Continue 4 AP for now  3. Follow-up 6 months     Interim History 1/26/2024:  The Mestinon has raised the blood pressure by about 10-15 mmHg to about 103 systolic. She still has to get up slowing. Not any easier than before.     The 4-AP is still 10mg BID. She has been on this for over a year and has not noticed any improvement.     She feels fine sitting. She is going to [facility], She has been given some ocular exercises to do. Dizziness can be triggered by movement of her eyes. There is also a sense of faintness when she stands up, lasting a couple minutes. Once she gets going, she's better. When she is sitting and moving her head, she can trigger the floaty feeling. Looking left is worse than looking right. There is no spinning vertigo or rocking vertigo. She has tried wearing compression stockings.      Plan:  Continue mestinon 25mg TID  Taper 4-AP. 1 capsule in the morning for a week then stop  Trial  of florinef 0.1mg after 4-AP  Trial of NUCCA for the left IJV compression at C1  Follow-up 6 months    Interim History 7/26/2024:  She could not go off of the 4-AP because her dizziness got a lot worse.   She is still on 10mg capsule twice a day. She is now more stable.   She stopped the Mestinon at least a couple months ago.  She never started the florinef.    She has has abdominal pain and bloating on July 3. This has not been better since then. This pain is 4-5/10. This seems to be triggered by eating, starting right away. The pain is more epigastric on both sides, about equal. She is taking sucralfate which has not helped.    7-15-24 CT ABDOMEN AND PELVIS WITH CONTRAST   IMPRESSION:   1.  Slight dilatation of the common bile duct which may be due to post cholecystectomy state.   2.  Unchanged diffuse prominence of the right hepatic lobe and diffuse fatty infiltration with no hepatic mass.   3.  No hydronephrosis.   4.  No splenomegaly.     She is seeing her PMD next week for further work-up of the abdominal pain.     She has dizziness spell 3-4 times a day. It can last from a few minutes to half a day. This is mostly a lightheaded feeling without nausea or spinning vertigo.  This is not triggered by any specific head movement. It can happen at any temperature and time of the day. She is not falling.     She saw Dr. Efrain Rossi at [facility]. She had adjustments every other week for 2 months.   She does feel that her head is more clear, more energy, and better balance.     PLAN:  Continue 4-AP 10mg BID  Maintenance NUCCA about once 6-weeks  Abdominal pain worsened with eating, under evaluation with PMD  Follow-up 6 months      Current Outpatient Medications:     sucralfate (CARAFATE) 1 GM tablet, Take 1 g by mouth, Disp: , Rfl:     cholecalciferol (VITAMIN D3) 25 mcg (1000 units) capsule, Take 1,000 Units by mouth daily, Disp: , Rfl:     estrogen conj-medroxyPROGESTERone (PREMPRO) 0.3-1.5 MG tablet, Take 1 tablet  by mouth See Admin Instructions, Disp: , Rfl:     fludrocortisone (FLORINEF) 0.1 MG tablet, Take 0.1 mg by mouth every morning, Disp: , Rfl:     levothyroxine (SYNTHROID/LEVOTHROID) 50 MCG tablet, Take 50 mcg by mouth daily, Disp: , Rfl:     NONFORMULARY, Take 10 mg by mouth 2 times daily, Disp: , Rfl:     SUDAFED SINUS CONGESTION 12HR 120 MG 12 hr tablet, Take 1 tablet by mouth 2 times daily as needed, Disp: , Rfl:     traZODone (DESYREL) 50 MG tablet, Take 1 tablet by mouth See Admin Instructions, Disp: , Rfl:     DATA:  I personally reviewed the following data.    Last brain imaging:  US Up Ex [facility] Thor Outlet Syn Bilat  Narrative: THORACIC INLET/OUTLET DUPLEX ULTRASOUND 5/12/2022 10:56 AM    CLINICAL HISTORY: 71F with forehead pressure and vertigo with head  extension.; Compression of vein; TOS (thoracic outlet syndrome).    COMPARISONS: None available.    REFERRING PROVIDER: GISELLE COKER CHA    TECHNIQUE: Bilateral innominate, subclavian, and axillary veins were  evaluated grayscale, color Doppler, Doppler waveform ultrasound.  Bilateral subclavian veins were evaluated with color Doppler and  Doppler waveform imaging through abduction maneuvers.    Bilateral index finger PPG's obtained at rest and with provocative  positions.    Bilateral internal jugular veins evaluated at rest with grayscale,  color Doppler, and Doppler waveform ultrasound. Bilateral internal  jugular veins evaluated with color Doppler and Doppler waveform  ultrasound through maneuvers.    FINDINGS:  RIGHT:       REST:            INTERNAL JUGULAR VEIN: 54 cm/s, phasic, fully compressible            INNOMINATE VEIN: 25 cm/s, phasic            SUBCLAVIAN VEIN, medial: 42 cm/s, phasic            SUBCLAVIAN VEIN, mid: 31 cm/s, phasic, fully compressible            SUBCLAVIAN VEIN, lateral: 73 cm/s, phasic, fully  compressible            AXILLARY VEIN: 42 cm/s, phasic, fully compressible         MID SUBCLAVIAN VEIN, sitting erect:            0  degrees: 91 cm/s, phasic            90 degrees: 166 cm/s, phasic            135 degrees: 236 cm/s, phasic            180 degrees: 209 cm/s, phasic         INTERNAL JUGULAR VEIN, sitting erect:            Neutral: 145 cm/s, phasic            Right: 168 cm/s, phasic            Left: 166 cm/s, phasic            Extension: 154 cm/s, phasic            Flexion: 126 cm/s, phasic         PPGs:            Baseline: Normal            Arms 90: Normal            Arms 180: Severely dampened, flattened              : Normal             head right: Normal             head left: Normal            Onset: Normal    LEFT:       REST:            INTERNAL JUGULAR VEIN: 99 cm/s, phasic, fully compressible            INNOMINATE VEIN: 32 cm/s, phasic            SUBCLAVIAN VEIN, medial: 79 cm/s, phasic            SUBCLAVIAN VEIN, mid: 56 cm/s, phasic, fully compressible            SUBCLAVIAN VEIN, lateral: 36 cm/s, phasic, fully  compressible            AXILLARY VEIN: 53 cm/s, phasic, fully compressible         MID SUBCLAVIAN VEIN, sitting erect:            0 degrees: 28 cm/s, phasic            90 degrees: 124 cm/s, phasic,             135 degrees: 137 cm/s, phasic            180 degrees: 228 cm/s, phasic         INTERNAL JUGULAR VEIN, sitting erect:            Neutral: 97 cm/s, phasic            Right: 190 cm/s, phasic            Left: 75 cm/s, phasic            Extension: 92 cm/s, phasic            Flexion: 81 cm/s, phasic        PPGs:            Baseline: Normal            Arms 90: Normal            Arms 180: Severely dampened, flattened            : Normal             head right: Normal             head left: Normal            Onset: Normal  Impression: IMPRESSION: Thoracic outlet/inlet physiology suggested. Correlate for  symptoms.    1. RIGHT:       A. No subclavian venous stenosis suggested at rest.       B. increased velocities and turbulent flow in the subclavian vein  with maneuvers  without cessation of flow.       C. Patent right internal jugular vein with increased velocities  during various maneuvers, without cessation of flow.       D. Loss of PPG waveform/severe dampening at 180 degrees arm  abduction, nonspecific. Remainder of maneuvers do not dampen PPG  waveforms.    2. LEFT:       A. No subclavian venous stenosis suggested at rest.       B. increased velocities and turbulent flow in the subclavian vein  with maneuvers without cessation of flow.       C. Patent left internal jugular vein with increased velocities  during various maneuvers, without cessation of flow.       D. Loss of PPG waveform/severe dampening at 180 degrees arm  abduction, nonspecific. Remainder of maneuvers do not dampen PPG  waveforms.  .    I have personally reviewed the examination and initial interpretation  and I agree with the findings.    ELSA TAMAYO MD         SYSTEM ID:  [id number]  CTV Head w Contrast  Narrative: CTV HEAD NECK WITH CONTRAST 5/12/2022 9:22 AM    History:  71F with head extension induced vertigo with downbeat  nystagmus. Question cerebellar degeneration, venous stenosis, styloid  length,.; Compression of vein; Nystagmus; TOS (thoracic outlet  syndrome)  ICD-10: Compression of vein; Nystagmus; TOS (thoracic outlet syndrome)     Comparison: Brain MRI 9/15/2021, 5/1/2008      Technique: Helically acquired thin section axial CT images were  obtained with 1 mm collimation through the brain after intravenous  bolus injection of iodinated contrast medium with an approximately  20-25 second delay between administration of contrast and scanning.  Image data were sent to the [employer] 3D workstation and postprocessed by  the radiologist using maximum intensity pixel (MIP), multiplanar and  volume rendered 3D reconstruction programs.     Contrast: Isovue 370 75cc    Findings: Diminutive size of the left transverse, sigmoid, and  internal jugular vein relative to the right. The left internal  jugular  vein then is markedly narrowed between the styloid process and  transverse process of C1 which also persists on the extension images.  No intraluminal filling defects visualized intracranially or  extracranially. Bilateral subclavian and axillary veins are widely  patent without evidence for stenosis with the arms down.    There is no mass effect, midline shift, or  intracranial hemorrhage.   The ventricles are proportionate to the cerebral sulci. The gray-white  matter differentiation of the cerebral hemispheres is preserved.  Postcontrast images demonstrate no abnormal intracranial parenchymal  or meningeal enhancement. Moderate diffuse cerebral atrophy. Unchanged  asymmetric volume loss in the right parasagittal occipital lobe.     The orbits, visualized portions of paranasal sinuses, and mastoid air  cells are relatively clear.     Scattered multilevel cervical spondylosis without significant spinal  canal or neuroforaminal narrowing. No suspicious sclerotic or lucent  osseous lesion.    Cervical soft tissues are unremarkable. Thyroid is unremarkable.    Partially visualized left chest wall ICD/Pacemaker.  Impression: Impression:    1. 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 Significance is uncertain given  the small size of this vein inferiorly. No intraluminal thrombus or  significant extraluminal stenosis in either the intracranial or  extracranial veins.  2. No CT evidence for thoracic outlet syndrome.  3. Moderate diffuse cerebral volume loss, likely sequelae of chronic  small vessel ischemic disease.    I have personally reviewed the examination and initial interpretation  and I agree with the findings.    ROCÍO TORRES MD         SYSTEM ID:  [id number]    30-minutes were spent in evaluation,  counseling, and documentation on the date of service.        Again, thank you for allowing me to participate in the care of your patient.      Sincerely,    Elva PEDROZA Cha, MD

## 2024-07-26 NOTE — NURSING NOTE
Current patient location: [address] [place] [address]   [place] [address]    Is the patient currently in the state of [place]? YES    Visit mode:VIDEO    If the visit is dropped, the patient can be reconnected by: VIDEO VISIT: Text to cell phone:   Telephone Information:   Mobile [phone]       Will anyone else be joining the visit? NO  (If patient encounters technical issues they should call [phone]    How would you like to obtain your AVS? MyChart    Are changes needed to the allergy or medication list? No    Are refills needed on medications prescribed by this physician? NO    Reason for visit: Follow Up    Mary WATERMAN

## 2024-08-08 ENCOUNTER — TELEPHONE (OUTPATIENT)
Dept: NEUROLOGY | Facility: CLINIC | Age: 74
End: 2024-08-08
Payer: MEDICARE

## 2024-08-08 NOTE — TELEPHONE ENCOUNTER
Left Voicemail (1st Attempt) and Sent Mychart (1st Attempt) for the patient to call back and schedule the following:    Appointment type: Return Neurololgy  Provider: Ratna  Return date: around 1/26/2025   Specialty phone number: [phone]  Additional appointment(s) needed: NA  Additonal Notes: 6 mo follow up, chilango Salomon on 8/8/2024 at 9:27 AM

## 2024-08-28 DIAGNOSIS — H55.09 DOWNBEAT NYSTAGMUS: ICD-10-CM

## 2024-08-28 DIAGNOSIS — R27.0 ATAXIA: Primary | ICD-10-CM

## 2025-01-27 ENCOUNTER — TELEPHONE (OUTPATIENT)
Dept: NEUROLOGY | Facility: CLINIC | Age: 75
End: 2025-01-27
Payer: MEDICARE

## 2025-01-27 NOTE — TELEPHONE ENCOUNTER
Left Voicemail (1st Attempt) and Sent Mychart (1st Attempt) for the patient to call back and schedule the following:    Appointment type: Return Neuro (virtual)  Provider: Dr. Segundo  Return date: July 2025  Specialty phone number: [phone]  Additional appointment(s) needed: NA  Additonal Notes:

## 2025-01-30 ENCOUNTER — TELEPHONE (OUTPATIENT)
Dept: NEUROLOGY | Facility: CLINIC | Age: 75
End: 2025-01-30
Payer: MEDICARE

## 2025-01-30 NOTE — TELEPHONE ENCOUNTER
Patient confirmed scheduled appointment:  Date: 8/1/2025  Time: 3:30 pm  Visit type: Return Neurology  Provider: Ratna  Location: virtual  Testing/imaging: NA  Additional notes: 6 month follow up    Simran Salomon on 1/30/2025 at 2:16 PM

## 2025-06-08 ENCOUNTER — HEALTH MAINTENANCE LETTER (OUTPATIENT)
Age: 75
End: 2025-06-08